# Patient Record
Sex: MALE | Race: WHITE | NOT HISPANIC OR LATINO | Employment: OTHER | URBAN - METROPOLITAN AREA
[De-identification: names, ages, dates, MRNs, and addresses within clinical notes are randomized per-mention and may not be internally consistent; named-entity substitution may affect disease eponyms.]

---

## 2017-04-17 ENCOUNTER — GENERIC CONVERSION - ENCOUNTER (OUTPATIENT)
Dept: OTHER | Facility: OTHER | Age: 82
End: 2017-04-17

## 2017-04-19 ENCOUNTER — ALLSCRIPTS OFFICE VISIT (OUTPATIENT)
Dept: OTHER | Facility: OTHER | Age: 82
End: 2017-04-19

## 2017-04-19 ENCOUNTER — GENERIC CONVERSION - ENCOUNTER (OUTPATIENT)
Dept: OTHER | Facility: OTHER | Age: 82
End: 2017-04-19

## 2017-04-20 ENCOUNTER — TRANSCRIBE ORDERS (OUTPATIENT)
Dept: ADMINISTRATIVE | Facility: HOSPITAL | Age: 82
End: 2017-04-20

## 2017-04-20 DIAGNOSIS — J42 UNSPECIFIED CHRONIC BRONCHITIS (HCC): ICD-10-CM

## 2017-04-20 DIAGNOSIS — R05.9 COUGH: Primary | ICD-10-CM

## 2017-04-21 ENCOUNTER — TRANSCRIBE ORDERS (OUTPATIENT)
Dept: LAB | Facility: CLINIC | Age: 82
End: 2017-04-21

## 2017-04-21 ENCOUNTER — APPOINTMENT (OUTPATIENT)
Dept: LAB | Facility: CLINIC | Age: 82
End: 2017-04-21
Payer: MEDICARE

## 2017-04-21 DIAGNOSIS — E08.00 DIABETES MELLITUS DUE TO UNDERLYING CONDITION WITH HYPEROSMOLARITY WITHOUT COMA, WITHOUT LONG-TERM CURRENT USE OF INSULIN (HCC): Primary | ICD-10-CM

## 2017-04-21 DIAGNOSIS — E08.00 DIABETES MELLITUS DUE TO UNDERLYING CONDITION WITH HYPEROSMOLARITY WITHOUT COMA, WITHOUT LONG-TERM CURRENT USE OF INSULIN (HCC): ICD-10-CM

## 2017-04-21 LAB
ALBUMIN SERPL BCP-MCNC: 3.1 G/DL (ref 3.5–5)
ALP SERPL-CCNC: 266 U/L (ref 46–116)
ALT SERPL W P-5'-P-CCNC: 28 U/L (ref 12–78)
ANION GAP SERPL CALCULATED.3IONS-SCNC: 6 MMOL/L (ref 4–13)
AST SERPL W P-5'-P-CCNC: 25 U/L (ref 5–45)
BASOPHILS # BLD AUTO: 0.03 THOUSANDS/ΜL (ref 0–0.1)
BASOPHILS NFR BLD AUTO: 1 % (ref 0–1)
BILIRUB SERPL-MCNC: 1.09 MG/DL (ref 0.2–1)
BUN SERPL-MCNC: 50 MG/DL (ref 5–25)
CALCIUM SERPL-MCNC: 8.6 MG/DL (ref 8.3–10.1)
CHLORIDE SERPL-SCNC: 107 MMOL/L (ref 100–108)
CHOLEST SERPL-MCNC: 135 MG/DL (ref 50–200)
CO2 SERPL-SCNC: 27 MMOL/L (ref 21–32)
CREAT SERPL-MCNC: 1.17 MG/DL (ref 0.6–1.3)
CREAT UR-MCNC: <13 MG/DL
EOSINOPHIL # BLD AUTO: 0.21 THOUSAND/ΜL (ref 0–0.61)
EOSINOPHIL NFR BLD AUTO: 3 % (ref 0–6)
ERYTHROCYTE [DISTWIDTH] IN BLOOD BY AUTOMATED COUNT: 17.3 % (ref 11.6–15.1)
EST. AVERAGE GLUCOSE BLD GHB EST-MCNC: 183 MG/DL
GFR SERPL CREATININE-BSD FRML MDRD: 59.8 ML/MIN/1.73SQ M
GLUCOSE P FAST SERPL-MCNC: 81 MG/DL (ref 65–99)
HBA1C MFR BLD: 8 % (ref 4.2–6.3)
HCT VFR BLD AUTO: 46.1 % (ref 36.5–49.3)
HDLC SERPL-MCNC: 58 MG/DL (ref 40–60)
HGB BLD-MCNC: 15.2 G/DL (ref 12–17)
LDLC SERPL CALC-MCNC: 63 MG/DL (ref 0–100)
LYMPHOCYTES # BLD AUTO: 1.9 THOUSANDS/ΜL (ref 0.6–4.47)
LYMPHOCYTES NFR BLD AUTO: 31 % (ref 14–44)
MCH RBC QN AUTO: 29.9 PG (ref 26.8–34.3)
MCHC RBC AUTO-ENTMCNC: 33 G/DL (ref 31.4–37.4)
MCV RBC AUTO: 91 FL (ref 82–98)
MICROALBUMIN UR-MCNC: 153 MG/L (ref 0–20)
MICROALBUMIN/CREAT 24H UR: >1177 MG/G CREATININE (ref 0–30)
MONOCYTES # BLD AUTO: 0.84 THOUSAND/ΜL (ref 0.17–1.22)
MONOCYTES NFR BLD AUTO: 14 % (ref 4–12)
NEUTROPHILS # BLD AUTO: 3.16 THOUSANDS/ΜL (ref 1.85–7.62)
NEUTS SEG NFR BLD AUTO: 51 % (ref 43–75)
NRBC BLD AUTO-RTO: 0 /100 WBCS
PLATELET # BLD AUTO: 95 THOUSANDS/UL (ref 149–390)
POTASSIUM SERPL-SCNC: 3.7 MMOL/L (ref 3.5–5.3)
PROT SERPL-MCNC: 6.9 G/DL (ref 6.4–8.2)
RBC # BLD AUTO: 5.08 MILLION/UL (ref 3.88–5.62)
SODIUM SERPL-SCNC: 140 MMOL/L (ref 136–145)
TRIGL SERPL-MCNC: 68 MG/DL
WBC # BLD AUTO: 6.15 THOUSAND/UL (ref 4.31–10.16)

## 2017-04-21 PROCEDURE — 85025 COMPLETE CBC W/AUTO DIFF WBC: CPT

## 2017-04-21 PROCEDURE — 83036 HEMOGLOBIN GLYCOSYLATED A1C: CPT

## 2017-04-21 PROCEDURE — 80053 COMPREHEN METABOLIC PANEL: CPT

## 2017-04-21 PROCEDURE — 82043 UR ALBUMIN QUANTITATIVE: CPT | Performed by: INTERNAL MEDICINE

## 2017-04-21 PROCEDURE — 36415 COLL VENOUS BLD VENIPUNCTURE: CPT

## 2017-04-21 PROCEDURE — 80061 LIPID PANEL: CPT

## 2017-04-21 PROCEDURE — 82570 ASSAY OF URINE CREATININE: CPT | Performed by: INTERNAL MEDICINE

## 2017-04-24 ENCOUNTER — ALLSCRIPTS OFFICE VISIT (OUTPATIENT)
Dept: OTHER | Facility: OTHER | Age: 82
End: 2017-04-24

## 2017-04-24 DIAGNOSIS — D69.6 THROMBOCYTOPENIA (HCC): ICD-10-CM

## 2017-04-24 DIAGNOSIS — I50.42 CHRONIC COMBINED SYSTOLIC AND DIASTOLIC HEART FAILURE (HCC): ICD-10-CM

## 2017-04-24 DIAGNOSIS — Z79.01 LONG TERM CURRENT USE OF ANTICOAGULANT: ICD-10-CM

## 2017-04-24 DIAGNOSIS — I48.91 ATRIAL FIBRILLATION (HCC): ICD-10-CM

## 2017-04-24 DIAGNOSIS — I35.0 NONRHEUMATIC AORTIC VALVE STENOSIS: ICD-10-CM

## 2017-05-10 ENCOUNTER — HOSPITAL ENCOUNTER (OUTPATIENT)
Dept: NON INVASIVE DIAGNOSTICS | Facility: CLINIC | Age: 82
Discharge: HOME | End: 2017-05-10
Payer: MEDICARE

## 2017-05-10 DIAGNOSIS — I48.91 ATRIAL FIBRILLATION (HCC): ICD-10-CM

## 2017-05-10 DIAGNOSIS — I50.42 CHRONIC COMBINED SYSTOLIC AND DIASTOLIC HEART FAILURE (HCC): ICD-10-CM

## 2017-05-10 DIAGNOSIS — I35.0 NONRHEUMATIC AORTIC VALVE STENOSIS: ICD-10-CM

## 2017-05-10 PROCEDURE — C8929 TTE W OR WO FOL WCON,DOPPLER: HCPCS

## 2017-05-10 RX ADMIN — PERFLUTREN 2 ML/MIN: 6.52 INJECTION, SUSPENSION INTRAVENOUS at 13:54

## 2017-05-11 ENCOUNTER — APPOINTMENT (OUTPATIENT)
Dept: LAB | Facility: CLINIC | Age: 82
End: 2017-05-11
Payer: MEDICARE

## 2017-05-11 ENCOUNTER — ALLSCRIPTS OFFICE VISIT (OUTPATIENT)
Dept: OTHER | Facility: OTHER | Age: 82
End: 2017-05-11

## 2017-05-11 ENCOUNTER — GENERIC CONVERSION - ENCOUNTER (OUTPATIENT)
Dept: OTHER | Facility: OTHER | Age: 82
End: 2017-05-11

## 2017-05-11 DIAGNOSIS — D69.6 THROMBOCYTOPENIA (HCC): ICD-10-CM

## 2017-05-11 LAB
BASOPHILS # BLD AUTO: 0.02 THOUSANDS/ΜL (ref 0–0.1)
BASOPHILS NFR BLD AUTO: 0 % (ref 0–1)
EOSINOPHIL # BLD AUTO: 0.19 THOUSAND/ΜL (ref 0–0.61)
EOSINOPHIL NFR BLD AUTO: 2 % (ref 0–6)
ERYTHROCYTE [DISTWIDTH] IN BLOOD BY AUTOMATED COUNT: 17.2 % (ref 11.6–15.1)
HCT VFR BLD AUTO: 45.5 % (ref 36.5–49.3)
HGB BLD-MCNC: 15.2 G/DL (ref 12–17)
LYMPHOCYTES # BLD AUTO: 1.88 THOUSANDS/ΜL (ref 0.6–4.47)
LYMPHOCYTES NFR BLD AUTO: 23 % (ref 14–44)
MCH RBC QN AUTO: 30.2 PG (ref 26.8–34.3)
MCHC RBC AUTO-ENTMCNC: 33.4 G/DL (ref 31.4–37.4)
MCV RBC AUTO: 90 FL (ref 82–98)
MONOCYTES # BLD AUTO: 0.82 THOUSAND/ΜL (ref 0.17–1.22)
MONOCYTES NFR BLD AUTO: 10 % (ref 4–12)
NEUTROPHILS # BLD AUTO: 5.15 THOUSANDS/ΜL (ref 1.85–7.62)
NEUTS SEG NFR BLD AUTO: 65 % (ref 43–75)
NRBC BLD AUTO-RTO: 0 /100 WBCS
PLATELET # BLD AUTO: 153 THOUSANDS/UL (ref 149–390)
RBC # BLD AUTO: 5.04 MILLION/UL (ref 3.88–5.62)
WBC # BLD AUTO: 8.08 THOUSAND/UL (ref 4.31–10.16)

## 2017-05-11 PROCEDURE — 85025 COMPLETE CBC W/AUTO DIFF WBC: CPT

## 2017-05-11 PROCEDURE — 84075 ASSAY ALKALINE PHOSPHATASE: CPT

## 2017-05-11 PROCEDURE — 84080 ASSAY ALKALINE PHOSPHATASES: CPT

## 2017-05-11 PROCEDURE — 36415 COLL VENOUS BLD VENIPUNCTURE: CPT

## 2017-05-12 ENCOUNTER — HOSPITAL ENCOUNTER (OUTPATIENT)
Dept: PULMONOLOGY | Facility: HOSPITAL | Age: 82
Discharge: HOME | End: 2017-05-12
Payer: MEDICARE

## 2017-05-12 ENCOUNTER — GENERIC CONVERSION - ENCOUNTER (OUTPATIENT)
Dept: OTHER | Facility: OTHER | Age: 82
End: 2017-05-12

## 2017-05-12 DIAGNOSIS — J42 UNSPECIFIED CHRONIC BRONCHITIS (HCC): ICD-10-CM

## 2017-05-12 DIAGNOSIS — R05.9 COUGH: ICD-10-CM

## 2017-05-12 PROCEDURE — 94760 N-INVAS EAR/PLS OXIMETRY 1: CPT

## 2017-05-12 PROCEDURE — 94060 EVALUATION OF WHEEZING: CPT

## 2017-05-12 PROCEDURE — 94727 GAS DIL/WSHOT DETER LNG VOL: CPT

## 2017-05-12 PROCEDURE — 94729 DIFFUSING CAPACITY: CPT

## 2017-05-15 LAB
ALP BONE CFR SERPL: 23 % (ref 12–68)
ALP INTEST CFR SERPL: 16 % (ref 0–18)
ALP LIVER CFR SERPL: 61 % (ref 13–88)
ALP SERPL-CCNC: 271 IU/L (ref 39–117)

## 2017-05-16 ENCOUNTER — HOSPITAL ENCOUNTER (EMERGENCY)
Facility: HOSPITAL | Age: 82
Discharge: HOME | End: 2017-05-16
Attending: EMERGENCY MEDICINE | Admitting: EMERGENCY MEDICINE
Payer: MEDICARE

## 2017-05-16 ENCOUNTER — APPOINTMENT (EMERGENCY)
Dept: RADIOLOGY | Facility: HOSPITAL | Age: 82
End: 2017-05-16
Payer: MEDICARE

## 2017-05-16 VITALS
TEMPERATURE: 98 F | HEART RATE: 66 BPM | RESPIRATION RATE: 19 BRPM | BODY MASS INDEX: 35 KG/M2 | OXYGEN SATURATION: 96 % | WEIGHT: 244.49 LBS | HEIGHT: 70 IN | DIASTOLIC BLOOD PRESSURE: 74 MMHG | SYSTOLIC BLOOD PRESSURE: 163 MMHG

## 2017-05-16 DIAGNOSIS — S42.309A HUMERUS FRACTURE: Primary | ICD-10-CM

## 2017-05-16 PROCEDURE — 93005 ELECTROCARDIOGRAM TRACING: CPT

## 2017-05-16 PROCEDURE — 73030 X-RAY EXAM OF SHOULDER: CPT

## 2017-05-16 PROCEDURE — 99284 EMERGENCY DEPT VISIT MOD MDM: CPT

## 2017-05-16 PROCEDURE — 96372 THER/PROPH/DIAG INJ SC/IM: CPT

## 2017-05-16 PROCEDURE — 73060 X-RAY EXAM OF HUMERUS: CPT

## 2017-05-16 RX ORDER — ALLOPURINOL 300 MG/1
300 TABLET ORAL DAILY
COMMUNITY
End: 2018-03-20 | Stop reason: SDUPTHER

## 2017-05-16 RX ORDER — MORPHINE SULFATE 4 MG/ML
4 INJECTION, SOLUTION INTRAMUSCULAR; INTRAVENOUS ONCE
Status: COMPLETED | OUTPATIENT
Start: 2017-05-16 | End: 2017-05-16

## 2017-05-16 RX ORDER — DOCUSATE SODIUM 100 MG/1
100 CAPSULE, LIQUID FILLED ORAL EVERY 12 HOURS
Qty: 60 CAPSULE | Refills: 0 | Status: SHIPPED | OUTPATIENT
Start: 2017-05-16 | End: 2017-06-15

## 2017-05-16 RX ORDER — OXYCODONE HYDROCHLORIDE 5 MG/1
5 TABLET ORAL EVERY 6 HOURS PRN
Qty: 20 TABLET | Refills: 0 | Status: SHIPPED | OUTPATIENT
Start: 2017-05-16 | End: 2018-05-15 | Stop reason: CLARIF

## 2017-05-16 RX ORDER — POTASSIUM CHLORIDE 20 MEQ/1
20 TABLET, EXTENDED RELEASE ORAL 2 TIMES DAILY
COMMUNITY
End: 2017-05-16

## 2017-05-16 RX ORDER — OXYCODONE HYDROCHLORIDE 5 MG/1
5 TABLET ORAL ONCE
Status: COMPLETED | OUTPATIENT
Start: 2017-05-16 | End: 2017-05-16

## 2017-05-16 RX ORDER — ISOSORBIDE MONONITRATE 10 MG/1
30 TABLET ORAL DAILY
Status: ON HOLD | COMMUNITY
End: 2018-05-23 | Stop reason: CLARIF

## 2017-05-16 RX ORDER — TORSEMIDE 20 MG/1
20 TABLET ORAL 3 TIMES DAILY
COMMUNITY
End: 2017-05-16

## 2017-05-16 RX ADMIN — MORPHINE SULFATE 4 MG: 4 INJECTION, SOLUTION INTRAMUSCULAR; INTRAVENOUS at 15:57

## 2017-05-16 RX ADMIN — OXYCODONE HYDROCHLORIDE 5 MG: 5 TABLET ORAL at 16:48

## 2017-05-17 LAB
ATRIAL RATE: 105 BPM
QRS AXIS: 234 DEGREES
QRSD INTERVAL: 160 MS
QT INTERVAL: 466 MS
QTC INTERVAL: 437 MS
T WAVE AXIS: 13 DEGREES
VENTRICULAR RATE: 53 BPM

## 2017-05-26 ENCOUNTER — HOSPITAL ENCOUNTER (OUTPATIENT)
Dept: RADIOLOGY | Facility: CLINIC | Age: 82
Discharge: HOME | End: 2017-05-26
Payer: MEDICARE

## 2017-05-26 ENCOUNTER — ALLSCRIPTS OFFICE VISIT (OUTPATIENT)
Dept: OTHER | Facility: OTHER | Age: 82
End: 2017-05-26

## 2017-05-26 DIAGNOSIS — S49.90XA INJURY OF SHOULDER OR UPPER ARM: ICD-10-CM

## 2017-05-26 PROCEDURE — 73030 X-RAY EXAM OF SHOULDER: CPT

## 2017-06-16 ENCOUNTER — GENERIC CONVERSION - ENCOUNTER (OUTPATIENT)
Dept: OTHER | Facility: OTHER | Age: 82
End: 2017-06-16

## 2017-06-27 ENCOUNTER — APPOINTMENT (OUTPATIENT)
Dept: RADIOLOGY | Facility: CLINIC | Age: 82
End: 2017-06-27
Payer: MEDICARE

## 2017-06-27 ENCOUNTER — ALLSCRIPTS OFFICE VISIT (OUTPATIENT)
Dept: OTHER | Facility: OTHER | Age: 82
End: 2017-06-27

## 2017-06-27 DIAGNOSIS — S42.295D OTHER NONDISPLACED FRACTURE OF UPPER END OF LEFT HUMERUS, SUBSEQUENT ENCOUNTER FOR FRACTURE WITH ROUTINE HEALING: ICD-10-CM

## 2017-06-27 DIAGNOSIS — M25.519 PAIN IN SHOULDER: ICD-10-CM

## 2017-06-27 PROCEDURE — 73030 X-RAY EXAM OF SHOULDER: CPT

## 2017-06-29 ENCOUNTER — ALLSCRIPTS OFFICE VISIT (OUTPATIENT)
Dept: OTHER | Facility: OTHER | Age: 82
End: 2017-06-29

## 2017-07-06 ENCOUNTER — GENERIC CONVERSION - ENCOUNTER (OUTPATIENT)
Dept: OTHER | Facility: OTHER | Age: 82
End: 2017-07-06

## 2017-07-12 ENCOUNTER — GENERIC CONVERSION - ENCOUNTER (OUTPATIENT)
Dept: OTHER | Facility: OTHER | Age: 82
End: 2017-07-12

## 2017-07-18 ENCOUNTER — GENERIC CONVERSION - ENCOUNTER (OUTPATIENT)
Dept: OTHER | Facility: OTHER | Age: 82
End: 2017-07-18

## 2017-07-25 ENCOUNTER — ALLSCRIPTS OFFICE VISIT (OUTPATIENT)
Dept: OTHER | Facility: OTHER | Age: 82
End: 2017-07-25

## 2017-07-25 ENCOUNTER — APPOINTMENT (OUTPATIENT)
Dept: RADIOLOGY | Facility: CLINIC | Age: 82
End: 2017-07-25
Payer: MEDICARE

## 2017-07-25 DIAGNOSIS — M25.519 PAIN IN SHOULDER: ICD-10-CM

## 2017-07-25 PROCEDURE — 73030 X-RAY EXAM OF SHOULDER: CPT

## 2017-07-26 ENCOUNTER — GENERIC CONVERSION - ENCOUNTER (OUTPATIENT)
Dept: OTHER | Facility: OTHER | Age: 82
End: 2017-07-26

## 2017-08-31 ENCOUNTER — GENERIC CONVERSION - ENCOUNTER (OUTPATIENT)
Dept: OTHER | Facility: OTHER | Age: 82
End: 2017-08-31

## 2017-09-06 ENCOUNTER — GENERIC CONVERSION - ENCOUNTER (OUTPATIENT)
Dept: OTHER | Facility: OTHER | Age: 82
End: 2017-09-06

## 2018-01-10 NOTE — MISCELLANEOUS
This is to state that this patient has no specific contraindication from cardiac standpoint to undergo retina surgery  He presents moderate risk based on his age and comorbidities  Patient should stop his anticoagulation  Eliquis for 2 days prior to surgery and restart when okay from surgical standpoint  If you have any specific questions, please do not hesitate to contact me  Electronically signed by:Alfreda MAHAN    Jul 12 2017 10:54PM EST

## 2018-01-11 NOTE — RESULT NOTES
Verified Results  (1) URIC ACID 02Jun2016 02:49PM Moni Modi   TW Order Number: [id number]  TW Order Number: [id number]     Test Name Result Flag Reference   URIC ACID 9 4 mg/dL H 4 2-8 0   Specimen collection should occur prior to Metamizole administration due to the potential for falsely depressed results

## 2018-01-13 VITALS
DIASTOLIC BLOOD PRESSURE: 70 MMHG | BODY MASS INDEX: 34.44 KG/M2 | OXYGEN SATURATION: 97 % | WEIGHT: 246 LBS | HEART RATE: 44 BPM | HEIGHT: 71 IN | SYSTOLIC BLOOD PRESSURE: 136 MMHG

## 2018-01-13 VITALS
WEIGHT: 244 LBS | HEART RATE: 46 BPM | OXYGEN SATURATION: 97 % | BODY MASS INDEX: 34.16 KG/M2 | DIASTOLIC BLOOD PRESSURE: 70 MMHG | HEIGHT: 71 IN | SYSTOLIC BLOOD PRESSURE: 150 MMHG

## 2018-01-13 VITALS
BODY MASS INDEX: 34.32 KG/M2 | HEART RATE: 58 BPM | DIASTOLIC BLOOD PRESSURE: 72 MMHG | SYSTOLIC BLOOD PRESSURE: 146 MMHG | OXYGEN SATURATION: 100 % | WEIGHT: 245.13 LBS | HEIGHT: 71 IN

## 2018-01-13 VITALS
BODY MASS INDEX: 34.44 KG/M2 | HEIGHT: 71 IN | DIASTOLIC BLOOD PRESSURE: 70 MMHG | OXYGEN SATURATION: 97 % | SYSTOLIC BLOOD PRESSURE: 138 MMHG | WEIGHT: 246 LBS | HEART RATE: 44 BPM

## 2018-01-13 VITALS
DIASTOLIC BLOOD PRESSURE: 70 MMHG | OXYGEN SATURATION: 97 % | BODY MASS INDEX: 34.44 KG/M2 | SYSTOLIC BLOOD PRESSURE: 138 MMHG | HEART RATE: 44 BPM | HEIGHT: 71 IN | WEIGHT: 246 LBS

## 2018-01-13 VITALS
HEIGHT: 71 IN | DIASTOLIC BLOOD PRESSURE: 76 MMHG | BODY MASS INDEX: 34.34 KG/M2 | SYSTOLIC BLOOD PRESSURE: 171 MMHG | OXYGEN SATURATION: 100 % | HEART RATE: 78 BPM | WEIGHT: 245.31 LBS

## 2018-01-13 VITALS
SYSTOLIC BLOOD PRESSURE: 173 MMHG | WEIGHT: 241 LBS | HEART RATE: 60 BPM | DIASTOLIC BLOOD PRESSURE: 66 MMHG | BODY MASS INDEX: 33.74 KG/M2 | HEIGHT: 71 IN

## 2018-01-14 VITALS
SYSTOLIC BLOOD PRESSURE: 169 MMHG | BODY MASS INDEX: 35 KG/M2 | HEART RATE: 77 BPM | DIASTOLIC BLOOD PRESSURE: 74 MMHG | HEIGHT: 71 IN | WEIGHT: 250 LBS

## 2018-01-15 VITALS
BODY MASS INDEX: 33.47 KG/M2 | SYSTOLIC BLOOD PRESSURE: 140 MMHG | DIASTOLIC BLOOD PRESSURE: 62 MMHG | WEIGHT: 240 LBS | HEART RATE: 68 BPM

## 2018-01-16 NOTE — MISCELLANEOUS
Assessment   1  Acute gouty arthropathy (274 01) (M10 00)  2  Gout (274 9) (M10 9)    Plan  Acute gouty arthropathy, Gout    · (1) URIC ACID; Status:Active; Requested NRY:[id number];   Perform:[facility]; OCV:[id number]; Last Updated By:Gaudencio Chambers; 6/2/2016 2:17:11 PM;Ordered; For:Acute gouty arthropathy, Gout; Ordered   By:Ronel Deal;  Atrial fibrillation, Hypertension    · Changed: From  Isosorbide Mononitrate ER 30 MG Oral Tablet Extended Release 24  Hour TAKE 1 TABLET ONCE DAILY To Isosorbide Mononitrate ER 30 MG Oral Tablet  Extended Release 24 Hour (Imdur) Take 1 tablet once daily  Rx By: Ronel Valencia; Dispense: 90 Days ; #:90 Tablet Extended Release 24   Hour; Refill: 3;For: Atrial fibrillation, Hypertension; HERBER = N; Record  Gout    · Changed: From  Allopurinol 100 MG Oral Tablet TAKE 1 TABLET DAILY To Allopurinol 300  MG Oral Tablet TAKE 1 TABLET DAILY  Rx By: Ronel Valencia; Dispense: 30 Days ; #:30 Tablet; Refill: 5;For: Gout; HERBER   = N; Sent To: [facility] [id number]    Discussion/Summary  Discussion Summary:   Acute gouty flare-improving on colchicine  continue colchicine for now  f/u next week for a recheck  start allopurinol 300mg daily  take 3 of your 100mg plls until you run out  f/u with dr Alice Cortes next week  case d/w dr Alice Cortes who saw pt and agrees with the A/P  Chief Complaint  Chief Complaint Free Text Note Form: Hospital follow-up      History of Present Illness  TCM Communication [facility]: The patient is being contacted for follow-up after hospitalization and HAS APPT  He was hospitalized at [place] [address]  The date of discharge: 5/28/16  Diagnosis: CELLULITIS  Medications reviewed and updated today  He scheduled a follow up appointment  Counseling was provided to the patient  DOING WELL  Communication performed and completed by LAUREN SANTOS   HPI: patient presents for hospital follow-up   He was admitted to [facility] on May 23rd and discharged on May 28  Initially he was felt to have right arm and hand cellulitis however the diagnosis changed to an acute gouty flareup  He was initially started on anti-biotics and then these were stopped and he was put on colchicine  He comes in today with continued swelling of the right hand and wrist but he states it was 10 times worse even at the time of discharge, it has improved since  He has much less pain, and no redness which she had before  the patient had fluid drained from the elbow and this was positive for uric acid crystals  complete labs can be viewed in Epic     patient had a CTA of the chest which showed no PE and no acute process, a Doppler ultrasound of both upper extremity showed no evidence of DVT,an x-ray of the right hand showed arthritic changes, no acute osseous abnormality  patient reports that he was on allopurinol 100 mg daily and he stopped in the hospital  He was not restarted on allopurinol but the plan would be possibly to restart it after the acute flare resolved  Review of Systems  Complete-Male:   Constitutional: No fever or chills, feels well, no tiredness, no recent weight gain or weight loss  Eyes: No complaints of eye pain, no red eyes, no discharge from eyes, no itchy eyes  ENT: no complaints of earache, no hearing loss, no nosebleeds, no nasal discharge, no sore throat, no hoarseness  Cardiovascular: No complaints of slow heart rate, no fast heart rate, no chest pain, no palpitations, no leg claudication, no lower extremity  Respiratory: No complaints of shortness of breath, no wheezing, no cough, no SOB on exertion, no orthopnea or PND  Gastrointestinal: No complaints of abdominal pain, no constipation, no nausea or vomiting, no diarrhea or bloody stools  Musculoskeletal: arthralgias, joint swelling and limb swelling  ROS Reviewed:   ROS reviewed  Active Problems   1  Aftercare for healing traumatic fracture of hip (V54 13) (C79 033Q)  2  Anticoagulated by anticoagulation treatment (V58 61) (Z79 01)  3  Aortic stenosis (424 1) (I35 0)  4  Arteriosclerotic heart disease (414 00) (I25 10)  5  Atrial fibrillation (427 31) (I48 91)  6  Cellulitis of hand (682 4) (L03 119)  7  Chronic combined systolic and diastolic CHF (congestive heart failure) (428 42,428 0)   (I50 42)  8  Chronic obstructive pulmonary disease (496) (J44 9)  9  Community acquired pneumonia (5) (J18 9)  10  Consolidation lung (481) (J18 1)  11  Duodenal ulcer (532 90) (K26 9)  12  Edema (782 3) (R60 9)  13  Esophageal reflux (530 81) (K21 9)  14  Finger stiffness (719 54) (M25 649)  15  History of gastrointestinal hemorrhage (V12 79) (Z87 19)  16  Hyperlipidemia (272 4) (E78 5)  17  Hypertension (401 9) (I10)  18  Intertrochanteric fx-closed (820 21) (S72 143A)  19  Lung mass (786 6) (R91 8)  20  Nonspecific abnormal findings on radiological and examination of lung field (793 19)    (R91 8)  21  Pain on swallowing (787 20) (R13 10)  22  Pleural effusion (511 9) (J90)  23  Pulmonary nodule seen on imaging study (793 11) (R91 1)  24  Shortness of breath (786 05) (R06 02)  25  Skin rash (782 1) (R21)  26  Stasis ulcer of lower extremity (454 0) (I83 009)  27  Type 2 diabetes mellitus (250 00) (E11 9)  28  Vitamin D deficiency (268 9) (E55 9)    Past Medical History   1  Aftercare for healing traumatic fracture of hip (V54 13) (S72 009D)  2  History of Congestive heart failure (428 0) (I50 9)  3  History of Coronary Artery Disease (V12 59)  4  History of Diabetes Mellitus (250 00)  5  History of gastrointestinal hemorrhage (V12 79) (Z87 19)  6  History of hyperlipidemia (V12 29) (Z86 39)  7  History of hypertension (V12 59) (Z86 79)  8  History of hypertension (V12 59) (Z86 79)  9  History of Nonspecific abnormal findings on radiological and examination of lung field   (793 19) (R91 8)    Surgical History   1  History of CABG  2  History of Hip Surgery Left  3   History of Knee Surgery Left  Surgical History Reviewed: The surgical history was reviewed and updated today  Family History  Father   1  No pertinent family history  Family History   2  Family history of Cancer  3  Family history of Diabetes Mellitus (V18 0)  4  Family history of Heart Disease (V17 49)  5  Family history of No Significant Family History  Family History Reviewed: The family history was reviewed and updated today  Social History    · Denied: History of Alcohol Use (History)   · Denied: History of Drug Use   · Former smoker (P38 51) (N42 358)  Social History Reviewed: The social history was reviewed and updated today  Current Meds  1  Aspirin 81 MG TABS; TAKE 1 TABLET DAILY; Therapy: 45XVP0518 to (Evaluate:26Jun2015) Recorded  2  Colcrys 0 6 MG Oral Tablet; TAKE 1 TABLET DAILY AS DIRECTED; Therapy: (Recorded:02Jun2016) to Recorded  3  Eliquis 2 5 MG Oral Tablet; 1 tab po bid; Therapy: 36BSA8001 to (Last Rx:52Xda9861) Ordered  4  Klor-Con M20 20 MEQ Oral Tablet Extended Release; TAKE 1 TABLET EVERY 12   HOURS; Therapy: 83UVE0531 to (Evaluate:14Apr2017)  Requested for: 19Apr2016; Last   Rx:19Apr2016 Ordered  5  Lantus 100 UNIT/ML Subcutaneous Solution; Take 40 units at bedtime  Requested for:   31Fek2752; Last Rx:23Apr2015 Ordered  6  Metolazone 2 5 MG Oral Tablet; 1 tablet on Tuesday and Friday for weight gain of more   than 4 pounds   prn;   Therapy: 22KFB5725 to  Requested for: 39RFP4999 Recorded  7  Metoprolol Succinate ER 25 MG Oral Tablet Extended Release 24 Hour; TAKE 1 TABLET   BY MOUTH ONCE A DAY  Requested for: 90FRQ4183; Last Rx:05Gyj0509 Ordered  8  NovoLOG FlexPen 100 UNIT/ML Subcutaneous Solution Pen-injector; Therapy: 47VFO1460 to Recorded  9  Pravastatin Sodium 10 MG Oral Tablet; TAKE 1 TABLET DAILY; Therapy: 08FNP5923 to (Rakesh Oquendo)  Requested for: 25KZE8101; Last   Rx:73Osw9253 Ordered  10   Torsemide 20 MG Oral Tablet; 2 tabs in the am and 1 tab in the afternoon; Therapy: 23Apr2015 to (Evaluate:14Apr2017)  Requested for: 19Apr2016; Last    Rx:19Apr2016 Ordered  11  UltiCare Insulin Syringe 31G X 5/16" 1 ML Miscellaneous; Therapy: 06Aug2013 to (Evaluate:21Jan2015) Recorded  Medication List Reviewed: The medication list was reviewed and updated today  Allergies   1  No Known Drug Allergies   2  Tape    Vitals  Signs [Data Includes: Current Encounter]   Recorded: 64URP2421 88:01ZC   Systolic: 849  Diastolic: 58  Recorded: 04CDK7231 01:24PM   Heart Rate: 60  Systolic: 085  Diastolic: 62  BP Cuff Size: Large  Height: 5 ft 11 in  Weight: 244 lb   BMI Calculated: 34 03  BSA Calculated: 2 29    Physical Exam    Constitutional   General appearance: No acute distress, well appearing and well nourished  Ears, Nose, Mouth, and Throat   Otoscopic examination: Tympanic membrance translucent with normal light reflex  Canals patent without erythema  Nasal mucosa, septum, and turbinates: Normal without edema or erythema  Oropharynx: Normal with no erythema, edema, exudate or lesions  Pulmonary   Respiratory effort: No increased work of breathing or signs of respiratory distress  Auscultation of lungs: Abnormal   Auscultation of the lungs revealed bibasilar rales/crackles and faint  Cardiovascular   Auscultation of heart: Abnormal   His rhythm is irregularly irregular  Examination of extremities for edema and/or varicosities: Abnormal   He has severe stasis edema of his lower extremities  Abdomen   Abdomen: Non-tender, no masses  Lymphatic   Palpation of lymph nodes in neck: No lymphadenopathy  Musculoskeletal   Inspection/palpation of joints, bones, and muscles: Abnormal   He has diffuse osteophytic changes of his hands, significant edema of the right hand and wrist, no redness  The patient and his son state it was much worse when he was discharged from the hospital    Neurologic   Cranial nerves: Cranial nerves 2-12 intact      Psychiatric   Mood and affect: Normal          Health Management  Type 2 diabetes mellitus   (1) HEMOGLOBIN A1C; every 6 months; Last 05SDK2136; Next Due: 43RNO0139; Active  (1) MICROALBUMIN CREATININE RATIO, RANDOM URINE; every 1 year; Last 94OKY0974; Next Due:  09EET9657; Active  *VB - Eye Exam; every 1 year; Last 33KDG7310; Next Due: 69GCN4872; Overdue  *VB-Foot Exam; every 1 year; Last 79FKV9869; Next Due: 51Pze5177; Active  Health Maintenance   Medicare Annual Wellness Visit; every 1 year; Next Due: 06VYY6679; Overdue    Future Appointments    Date/Time Provider Specialty Site   06/20/2016 03:30 PM FANNY Barron   Cardiology [facility] CARDIOLOGY [facility]   07/11/2016 03:00 PM Bryce Moscoso DO Internal Medicine [facility]   06/09/2016 02:30 PM Kaye Edwards [facility] Internal Medicine [facility]   07/13/2016 02:20 PM Todd Rai DO Pulmonary Medicine  [address]     Signatures   Electronically signed by : Daiana Amato [facility]; Jun 2 2016  2:21PM EST                       (Author)    Electronically signed by : Sridevi Galeano DO; Jun 2 2016  5:02PM EST                       (Co-author)    Electronically signed by : Sridevi Galeano DO; Jun 2 2016  5:02PM EST                       (Co-author)

## 2018-01-18 NOTE — RESULT NOTES
Verified Results  ECHO COMPLETE WITH CONTRAST IF INDICATED 46NAY0409 12:52PM Jesica Méndez Order Number: [id number]    - Patient Instructions: To schedule this appointment, please contact Central Scheduling at [phone]  Test Name Result Flag Reference   ECHO COMPLETE WITH CONTRAST IF INDICATED (Report)     [address] [facility]   [phone]     Transthoracic Echocardiogram   Limited 2D, M-mode, Doppler, and Color Doppler     Study date: 10-May-2017     Patient: Cayla Bucio   MR number: [id number]   Account number: [de-identified]   : 1935   Age: 81 years   Gender: Male   Status: Outpatient   Location: [facility]   Height: 71 in   Weight: 245 lb   BP: 146/ 72 mmHg     Indications: Atrial fibrillation  Diagnoses: I48 0 - Atrial fibrillation     Sonographer: Nieto RCS   Interpreting Physician: Linda Hernandez MD   Primary Physician: Stacey Vaz DO   Referring Physician: Linda Hernandez MD   Group: [facility]     SUMMARY     LEFT VENTRICLE:   The ventricle was mildly dilated  Ejection fraction was estimated to be 40 % to 45%  There is abnormal septal motion c/w history of CABG  There is hypokinesis of the distal septum, apex and distal anetrior wall  RIGHT VENTRICLE:   The ventricle was mildly dilated  Systolic function was mildly reduced  Estimated peak pressure was at least 35 mmHg  LEFT ATRIUM:   The atrium was mildly dilated  RIGHT ATRIUM:   The atrium was dilated  MITRAL VALVE:   There was mild to moderate annular calcification  There was trace regurgitation  AORTIC VALVE:   Leaflets exhibited moderate calcification  There was moderate stenosis  Peak gradient 38 mm Hg and mean 25 mm Hg  TRICUSPID VALVE:   There was mild regurgitation  HISTORY: PRIOR HISTORY: AS  Pleural effusion  COPD  Congestive heart failure   Risk factors: hypertension, diabetes, medication-treated hypercholesterolemia, and a family history of coronary artery disease  PRIOR PROCEDURES: Coronary   bypass  PROCEDURE: The study was performed in the [address] [place] [address]  This was a routine study  The transthoracic approach was used  The study included limited 2D imaging, M-mode, limited spectral Doppler, and color Doppler  The heart   rate was 63 bpm, at the start of the study  Images were obtained from the parasternal and apical acoustic windows  Intravenous contrast (Definity solution [1 3 ml Definity/8 7ml normal saline solution], 2 ml) was administered to opacify   the left ventricle  This was a technically difficult study  LEFT VENTRICLE: The ventricle was mildly dilated  Ejection fraction was estimated to be 40 % to 45%  There is abnormal septal motion c/w history of CABG  There is hypokinesis of the distal septum, apex and distal anetrior wall  RIGHT VENTRICLE: The ventricle was mildly dilated  Systolic function was mildly reduced  Wall thickness was normal  DOPPLER: Estimated peak pressure was at least 35 mmHg  LEFT ATRIUM: The atrium was mildly dilated  RIGHT ATRIUM: The atrium was dilated  MITRAL VALVE: There was mild to moderate annular calcification  DOPPLER: There was trace regurgitation  AORTIC VALVE: Leaflets exhibited moderate calcification  The valve was not well visualized  DOPPLER: There was moderate stenosis  Peak gradient 38 mm Hg and mean 25 mm Hg  TRICUSPID VALVE: The valve structure was normal  There was normal leaflet separation  DOPPLER: The transtricuspid velocity was within the normal range  There was no evidence for stenosis  There was mild regurgitation  PULMONIC VALVE: Leaflets exhibited normal thickness, no calcification, and normal cuspal separation  DOPPLER: The transpulmonic velocity was within the normal range  There was no regurgitation  PERICARDIUM: There was no pericardial effusion  The pericardium was normal in appearance  AORTA: The root exhibited normal size  SYSTEM MEASUREMENT TABLES     Apical four chamber   TAPSE: 10 1 mm     Unspecified Scan Mode   Aortic Root Diameter; End Systole;: 37 6 mm   Aortic valve Area; Continuity Equation by Velocity Time Integral; Systole;: 1 16 cm2   Cardiovascular Orifice Diameter; End Systole;: 21 7 mm   Gradient Pressure, Peak; Simplified Bernoulli; Antegrade Flow; Systole;: 33 2 mm[Hg]   Gradient pressure, average; Simplified Bernoulli; Antegrade Flow; Systole;: 21 6 mm[Hg]   Left atrial diameter; End Diastole;: 38 5 mm   Cardiac Output; Teichholz; Systole;: 3 32 L/min   Interventricular Septum Diastolic Thickness; Teichholz; End Diastole;: 12 5 mm   Left Ventricle Internal End Diastolic Dimension; Teichholz;: 49 4 mm   Left Ventricle Internal Systolic Dimension; Teichholz; End Systole;: 38 1 mm   Left Ventricle Posterior Wall Diastolic Thickness; Teichholz; End Diastole;: 12 1 mm   Left Ventricular Ejection Fraction; Teichholz;: 45 8 %   Stroke volume;  Teichholz; Systole;: 52 7 ml   Mitral Valve Area; Area by Pressure Half-Time; Systole;: 2 89 cm2   Maximum Tricuspid valve regurgitation pressure gradient; Regurgitant Flow; Systole;: 29 1 mm[Hg]     [facility] Accredited [facility]     Prepared and electronically signed by     Lidia Montejo MD   Signed 11-May-2017 19:56:34

## 2018-03-20 DIAGNOSIS — E79.0 HYPERURICEMIA: Primary | ICD-10-CM

## 2018-03-20 RX ORDER — ALLOPURINOL 300 MG/1
TABLET ORAL
Qty: 90 TABLET | Refills: 0 | Status: ON HOLD | OUTPATIENT
Start: 2018-03-20 | End: 2018-05-23 | Stop reason: CLARIF

## 2018-04-16 ENCOUNTER — TELEPHONE (OUTPATIENT)
Dept: INTERNAL MEDICINE CLINIC | Facility: CLINIC | Age: 83
End: 2018-04-16

## 2018-04-16 NOTE — TELEPHONE ENCOUNTER
Per dr Hannah Caldwell urgent care needs urine and culture, as dr Hannah Caldwell stated he can not prescribe something as he does not know what this is

## 2018-04-16 NOTE — TELEPHONE ENCOUNTER
Patient is in [place]   Thinks he has a UTI  Every drs office he called is a 5 week wait  Wants Dr Shady Cruz to call him and hopefully he can rx something for him  Please call him on his cell at [phone]

## 2018-04-22 DIAGNOSIS — I10 ESSENTIAL HYPERTENSION: Primary | ICD-10-CM

## 2018-04-23 RX ORDER — POTASSIUM CHLORIDE 1500 MG/1
TABLET, EXTENDED RELEASE ORAL
Qty: 180 TABLET | Refills: 1 | Status: SHIPPED | OUTPATIENT
Start: 2018-04-23 | End: 2018-10-13 | Stop reason: SDUPTHER

## 2018-05-07 ENCOUNTER — TELEPHONE (OUTPATIENT)
Dept: INTERNAL MEDICINE CLINIC | Facility: CLINIC | Age: 83
End: 2018-05-07

## 2018-05-07 NOTE — TELEPHONE ENCOUNTER
Pt called-scheduled a 6 month fup w/ Dr Xuan Zelaya  He's asking if Dr Xuan Zelaya would want labs to be done before hand  I scheduled him for Tuesday 5/15     Please call patient at [phone]

## 2018-05-08 ENCOUNTER — TRANSCRIBE ORDERS (OUTPATIENT)
Dept: LAB | Facility: CLINIC | Age: 83
End: 2018-05-08

## 2018-05-08 ENCOUNTER — APPOINTMENT (OUTPATIENT)
Dept: LAB | Facility: CLINIC | Age: 83
End: 2018-05-08
Payer: MEDICARE

## 2018-05-08 DIAGNOSIS — Z79.4 ENCOUNTER FOR LONG-TERM (CURRENT) USE OF INSULIN (HCC): ICD-10-CM

## 2018-05-08 DIAGNOSIS — E13.8 DIABETES MELLITUS OF OTHER TYPE WITH COMPLICATION, UNSPECIFIED WHETHER LONG TERM INSULIN USE: ICD-10-CM

## 2018-05-08 DIAGNOSIS — I10 ESSENTIAL HYPERTENSION: Primary | ICD-10-CM

## 2018-05-08 DIAGNOSIS — R30.0 DYSURIA: Primary | ICD-10-CM

## 2018-05-08 LAB
ANION GAP SERPL CALCULATED.3IONS-SCNC: 6 MMOL/L (ref 4–13)
BUN SERPL-MCNC: 43 MG/DL (ref 5–25)
CALCIUM SERPL-MCNC: 9 MG/DL (ref 8.3–10.1)
CHLORIDE SERPL-SCNC: 105 MMOL/L (ref 100–108)
CHOLEST SERPL-MCNC: 112 MG/DL (ref 50–200)
CO2 SERPL-SCNC: 26 MMOL/L (ref 21–32)
CREAT SERPL-MCNC: 1.08 MG/DL (ref 0.6–1.3)
CREAT UR-MCNC: 15.1 MG/DL
EST. AVERAGE GLUCOSE BLD GHB EST-MCNC: 189 MG/DL
GFR SERPL CREATININE-BSD FRML MDRD: 64 ML/MIN/1.73SQ M
GLUCOSE P FAST SERPL-MCNC: 164 MG/DL (ref 65–99)
HBA1C MFR BLD: 8.2 % (ref 4.2–6.3)
HDLC SERPL-MCNC: 57 MG/DL (ref 40–60)
LDLC SERPL CALC-MCNC: 42 MG/DL (ref 0–100)
MICROALBUMIN UR-MCNC: 292 MG/L (ref 0–20)
MICROALBUMIN/CREAT 24H UR: 1934 MG/G CREATININE (ref 0–30)
NONHDLC SERPL-MCNC: 55 MG/DL
POTASSIUM SERPL-SCNC: 4.4 MMOL/L (ref 3.5–5.3)
SODIUM SERPL-SCNC: 137 MMOL/L (ref 136–145)
TRIGL SERPL-MCNC: 63 MG/DL

## 2018-05-08 PROCEDURE — 83036 HEMOGLOBIN GLYCOSYLATED A1C: CPT

## 2018-05-08 PROCEDURE — 82570 ASSAY OF URINE CREATININE: CPT | Performed by: INTERNAL MEDICINE

## 2018-05-08 PROCEDURE — 80048 BASIC METABOLIC PNL TOTAL CA: CPT

## 2018-05-08 PROCEDURE — 82043 UR ALBUMIN QUANTITATIVE: CPT | Performed by: INTERNAL MEDICINE

## 2018-05-08 PROCEDURE — 36415 COLL VENOUS BLD VENIPUNCTURE: CPT

## 2018-05-08 PROCEDURE — 80061 LIPID PANEL: CPT

## 2018-05-14 ENCOUNTER — APPOINTMENT (OUTPATIENT)
Dept: LAB | Facility: CLINIC | Age: 83
End: 2018-05-14
Payer: MEDICARE

## 2018-05-14 DIAGNOSIS — R30.0 DYSURIA: ICD-10-CM

## 2018-05-14 DIAGNOSIS — I10 ESSENTIAL HYPERTENSION: ICD-10-CM

## 2018-05-14 LAB
BACTERIA UR QL AUTO: ABNORMAL /HPF
BASOPHILS # BLD AUTO: 0.02 THOUSANDS/ΜL (ref 0–0.1)
BASOPHILS NFR BLD AUTO: 0 % (ref 0–1)
BILIRUB UR QL STRIP: NEGATIVE
CLARITY UR: ABNORMAL
COLOR UR: YELLOW
EOSINOPHIL # BLD AUTO: 0.15 THOUSAND/ΜL (ref 0–0.61)
EOSINOPHIL NFR BLD AUTO: 2 % (ref 0–6)
ERYTHROCYTE [DISTWIDTH] IN BLOOD BY AUTOMATED COUNT: 17 % (ref 11.6–15.1)
GLUCOSE UR STRIP-MCNC: NEGATIVE MG/DL
HCT VFR BLD AUTO: 43.3 % (ref 36.5–49.3)
HGB BLD-MCNC: 14.4 G/DL (ref 12–17)
HGB UR QL STRIP.AUTO: ABNORMAL
HYALINE CASTS #/AREA URNS LPF: ABNORMAL /LPF
KETONES UR STRIP-MCNC: NEGATIVE MG/DL
LEUKOCYTE ESTERASE UR QL STRIP: ABNORMAL
LYMPHOCYTES # BLD AUTO: 1.86 THOUSANDS/ΜL (ref 0.6–4.47)
LYMPHOCYTES NFR BLD AUTO: 20 % (ref 14–44)
MCH RBC QN AUTO: 30.3 PG (ref 26.8–34.3)
MCHC RBC AUTO-ENTMCNC: 33.3 G/DL (ref 31.4–37.4)
MCV RBC AUTO: 91 FL (ref 82–98)
MONOCYTES # BLD AUTO: 1.19 THOUSAND/ΜL (ref 0.17–1.22)
MONOCYTES NFR BLD AUTO: 13 % (ref 4–12)
NEUTROPHILS # BLD AUTO: 5.95 THOUSANDS/ΜL (ref 1.85–7.62)
NEUTS SEG NFR BLD AUTO: 65 % (ref 43–75)
NITRITE UR QL STRIP: NEGATIVE
NON-SQ EPI CELLS URNS QL MICRO: ABNORMAL /HPF
NRBC BLD AUTO-RTO: 0 /100 WBCS
PH UR STRIP.AUTO: 6.5 [PH] (ref 4.5–8)
PLATELET # BLD AUTO: 166 THOUSANDS/UL (ref 149–390)
PROT UR STRIP-MCNC: ABNORMAL MG/DL
RBC # BLD AUTO: 4.75 MILLION/UL (ref 3.88–5.62)
RBC #/AREA URNS AUTO: ABNORMAL /HPF
SP GR UR STRIP.AUTO: 1.01 (ref 1–1.03)
UROBILINOGEN UR QL STRIP.AUTO: 1 E.U./DL
WBC # BLD AUTO: 9.19 THOUSAND/UL (ref 4.31–10.16)
WBC #/AREA URNS AUTO: ABNORMAL /HPF

## 2018-05-14 PROCEDURE — 36415 COLL VENOUS BLD VENIPUNCTURE: CPT

## 2018-05-14 PROCEDURE — 87186 SC STD MICRODIL/AGAR DIL: CPT

## 2018-05-14 PROCEDURE — 87077 CULTURE AEROBIC IDENTIFY: CPT

## 2018-05-14 PROCEDURE — 87086 URINE CULTURE/COLONY COUNT: CPT

## 2018-05-14 PROCEDURE — 81001 URINALYSIS AUTO W/SCOPE: CPT | Performed by: INTERNAL MEDICINE

## 2018-05-14 PROCEDURE — 85025 COMPLETE CBC W/AUTO DIFF WBC: CPT

## 2018-05-14 RX ORDER — METOPROLOL SUCCINATE 25 MG/1
1 TABLET, EXTENDED RELEASE ORAL DAILY
COMMUNITY
End: 2018-05-18 | Stop reason: SDUPTHER

## 2018-05-14 RX ORDER — COLCHICINE 0.6 MG/1
1 TABLET ORAL DAILY
COMMUNITY
End: 2018-05-21 | Stop reason: SDUPTHER

## 2018-05-14 RX ORDER — BLOOD SUGAR DIAGNOSTIC
STRIP MISCELLANEOUS
COMMUNITY
Start: 2013-08-06

## 2018-05-15 ENCOUNTER — OFFICE VISIT (OUTPATIENT)
Dept: INTERNAL MEDICINE CLINIC | Facility: CLINIC | Age: 83
End: 2018-05-15
Payer: MEDICARE

## 2018-05-15 VITALS
WEIGHT: 234 LBS | BODY MASS INDEX: 33.5 KG/M2 | SYSTOLIC BLOOD PRESSURE: 148 MMHG | DIASTOLIC BLOOD PRESSURE: 62 MMHG | HEIGHT: 70 IN | HEART RATE: 63 BPM | OXYGEN SATURATION: 93 %

## 2018-05-15 DIAGNOSIS — R60.9 EDEMA, UNSPECIFIED TYPE: ICD-10-CM

## 2018-05-15 DIAGNOSIS — E11.65 TYPE 2 DIABETES MELLITUS WITH HYPERGLYCEMIA, WITH LONG-TERM CURRENT USE OF INSULIN (HCC): Primary | ICD-10-CM

## 2018-05-15 DIAGNOSIS — I10 ESSENTIAL HYPERTENSION: ICD-10-CM

## 2018-05-15 DIAGNOSIS — Z79.4 TYPE 2 DIABETES MELLITUS WITH HYPERGLYCEMIA, WITH LONG-TERM CURRENT USE OF INSULIN (HCC): Primary | ICD-10-CM

## 2018-05-15 DIAGNOSIS — N30.01 ACUTE CYSTITIS WITH HEMATURIA: ICD-10-CM

## 2018-05-15 DIAGNOSIS — I25.10 CORONARY ARTERY DISEASE INVOLVING NATIVE HEART WITHOUT ANGINA PECTORIS, UNSPECIFIED VESSEL OR LESION TYPE: ICD-10-CM

## 2018-05-15 DIAGNOSIS — I48.20 CHRONIC A-FIB (HCC): ICD-10-CM

## 2018-05-15 DIAGNOSIS — I50.42 CHRONIC COMBINED SYSTOLIC AND DIASTOLIC CONGESTIVE HEART FAILURE (HCC): ICD-10-CM

## 2018-05-15 PROCEDURE — 99214 OFFICE O/P EST MOD 30 MIN: CPT | Performed by: INTERNAL MEDICINE

## 2018-05-15 RX ORDER — CIPROFLOXACIN 500 MG/1
500 TABLET, FILM COATED ORAL EVERY 12 HOURS SCHEDULED
Qty: 14 TABLET | Refills: 0 | Status: SHIPPED | OUTPATIENT
Start: 2018-05-22 | End: 2018-05-26 | Stop reason: HOSPADM

## 2018-05-16 DIAGNOSIS — I25.10 ATHEROSCLEROTIC CARDIOVASCULAR DISEASE: Primary | ICD-10-CM

## 2018-05-16 LAB — BACTERIA UR CULT: ABNORMAL

## 2018-05-16 RX ORDER — ISOSORBIDE MONONITRATE 30 MG/1
TABLET, EXTENDED RELEASE ORAL
Qty: 90 TABLET | Refills: 2 | Status: SHIPPED | OUTPATIENT
Start: 2018-05-16 | End: 2018-05-21 | Stop reason: SDUPTHER

## 2018-05-16 NOTE — PROGRESS NOTES
Assessment/Plan:  He appears to have a urinary tract infection at the present time  He is going to have an ultrasound of his kidney and bladder  He was given Cipro to take  Regarding his diabetes he is followed by the endocrinologist   A1c was 8 3  He has coronary artery disease but no chest pain  He has atrial fibrillation and chronic aortic stenosis  No signs of CHF at the present time  Edema is present       His cholesterol was 112       For the urinary tract infection he will be given Cipro b i d   Side effects explained  He will have an ultrasound of his kidney and bladder  He will call if not completely improved  He will follow up with his other specialists  He will repeat the urinalysis 1 week after therapy  Recent Results (from the past 1008 hour(s))   Microalbumin / creatinine urine ratio    Collection Time: 05/08/18 10:31 AM   Result Value Ref Range    Creatinine, Ur 15 1 mg/dL    Microalbum  ,U,Random 292 0 (H) 0 0 - 20 0 mg/L    Microalb Creat Ratio 1,934 (H) 0 - 30 mg/g creatinine   HEMOGLOBIN A1C W/ EAG ESTIMATION    Collection Time: 05/08/18 10:31 AM   Result Value Ref Range    Hemoglobin A1C 8 2 (H) 4 2 - 6 3 %     mg/dl   Lipid panel    Collection Time: 05/08/18 10:31 AM   Result Value Ref Range    Cholesterol 112 50 - 200 mg/dL    Triglycerides 63 <=150 mg/dL    HDL, Direct 57 40 - 60 mg/dL    LDL Calculated 42 0 - 100 mg/dL    Non-HDL-Chol (CHOL-HDL) 55 mg/dl   Basic metabolic panel    Collection Time: 05/08/18 10:31 AM   Result Value Ref Range    Sodium 137 136 - 145 mmol/L    Potassium 4 4 3 5 - 5 3 mmol/L    Chloride 105 100 - 108 mmol/L    CO2 26 21 - 32 mmol/L    Anion Gap 6 4 - 13 mmol/L    BUN 43 (H) 5 - 25 mg/dL    Creatinine 1 08 0 60 - 1 30 mg/dL    Glucose, Fasting 164 (H) 65 - 99 mg/dL    Calcium 9 0 8 3 - 10 1 mg/dL    eGFR 64 ml/min/1 73sq m   CBC and differential    Collection Time: 05/14/18 10:31 AM   Result Value Ref Range    WBC 9 19 4 31 - 10 16 Thousand/uL RBC 4 75 3 88 - 5 62 Million/uL    Hemoglobin 14 4 12 0 - 17 0 g/dL    Hematocrit 43 3 36 5 - 49 3 %    MCV 91 82 - 98 fL    MCH 30 3 26 8 - 34 3 pg    MCHC 33 3 31 4 - 37 4 g/dL    RDW 17 0 (H) 11 6 - 15 1 %    Platelets 340 452 - 270 Thousands/uL    nRBC 0 /100 WBCs    Neutrophils Relative 65 43 - 75 %    Lymphocytes Relative 20 14 - 44 %    Monocytes Relative 13 (H) 4 - 12 %    Eosinophils Relative 2 0 - 6 %    Basophils Relative 0 0 - 1 %    Neutrophils Absolute 5 95 1 85 - 7 62 Thousands/µL    Lymphocytes Absolute 1 86 0 60 - 4 47 Thousands/µL    Monocytes Absolute 1 19 0 17 - 1 22 Thousand/µL    Eosinophils Absolute 0 15 0 00 - 0 61 Thousand/µL    Basophils Absolute 0 02 0 00 - 0 10 Thousands/µL   Urinalysis with microscopic    Collection Time: 05/14/18 10:31 AM   Result Value Ref Range    Clarity, UA Cloudy     Color, UA Yellow     Specific Gravity, UA 1 009 1 003 - 1 030    pH, UA 6 5 4 5 - 8 0    Glucose, UA Negative Negative mg/dl    Ketones, UA Negative Negative mg/dl    Blood, UA Moderate (A) Negative    Protein, UA 30 (1+) (A) Negative mg/dl    Nitrite, UA Negative Negative    Bilirubin, UA Negative Negative    Urobilinogen, UA 1 0 0 2, 1 0 E U /dl E U /dl    Leukocytes, UA Large (A) Negative    WBC, UA Innumerable (A) None Seen, 0-5, 5-55, 5-65 /hpf    RBC, UA 4-10 (A) None Seen, 0-5 /hpf    Hyaline Casts, UA None Seen None Seen /lpf    Bacteria, UA Occasional None Seen, Occasional /hpf    Epithelial Cells None Seen None Seen, Occasional /hpf   Urine culture    Collection Time: 05/14/18 10:31 AM   Result Value Ref Range    Urine Culture 60,000-69,000 cfu/ml Escherichia coli (A)        Susceptibility    Escherichia coli - SHANT     Ampicillin ($$) <=8 00 Susceptible ug/ml     Aztreonam ($$$)  <=4 Susceptible ug/ml     Cefazolin ($) <=2 00 Susceptible ug/ml     Ciprofloxacin ($)  <=1 00 Susceptible ug/ml     Gentamicin ($$) <=1 Susceptible ug/ml     Levofloxacin ($) <=0 25 Susceptible ug/ml Nitrofurantoin <=32 Susceptible ug/ml     Tetracycline <=4 Susceptible ug/ml     Tobramycin ($) <=1 Susceptible ug/ml     Trimethoprim + Sulfamethoxazole ($$$) <=2/38 Susceptible ug/ml       1  Type 2 diabetes mellitus with hyperglycemia, with long-term current use of insulin ([facility] )     2  Chronic a-fib ([facility] )     3  Essential hypertension     4  Chronic combined systolic and diastolic congestive heart failure ([facility] )     5  Coronary artery disease involving native heart without angina pectoris, unspecified vessel or lesion type     6  Acute cystitis with hematuria  ciprofloxacin (CIPRO) 500 mg tablet    US kidney and bladder    UA (URINE) with reflex to Microscopic    UA w Reflex to Microscopic w Reflex to Culture -Lab Collect   7  Edema, unspecified type         Orders Placed This Encounter   Procedures    US kidney and bladder    UA (URINE) with reflex to Microscopic    UA w Reflex to Microscopic w Reflex to Culture -Lab Collect         Subjective:  Problems include 1  Diabetes 2  Coronary artery disease 3  Aortic stenosis 4  Atrial fibrillation 5  CHF 6  Stasis edema 7  Recent urinary tract infection  Patient ID: Shasta Escalante is a 81 y o  male  HPI he comes in for follow-up  He was away during the winter  He got a urinary tract infection symptoms about 3 weeks ago  He has burning  He has frequency  He did not seek medical care  He still has some burning but feels better than he did  The following portions of the patient's history were reviewed and updated as appropriate:   He has a past medical history of Aortic stenosis; Atrial fibrillation ([facility] ); Cellulitis of hand, left; Cellulitis of hand, right; CHF (congestive heart failure) ([facility] ); Coronary artery disease; Diabetes mellitus ([facility] ); GI hemorrhage; Gout; Hyperlipidemia; Hypertension; Lung mass; Nonspecific abnormal findings on imaging of lung; Thrombocytopenia ([facility] ); and Vitamin D deficiency  ,   does not have any pertinent problems on file  ,   has a past surgical history that includes Coronary artery bypass graft; Fracture surgery; Cardiac surgery; Joint replacement; Hip surgery (Left); and Knee surgery (Left)  ,  family history includes Cancer in his family; Diabetes in his family; Heart disease in his family; No Known Problems in his father  ,   reports that he has quit smoking  He has never used smokeless tobacco  He reports that he does not drink alcohol or use drugs  ,  is allergic to flovent [fluticasone]; medical tape; and other       Current Outpatient Prescriptions:     apixaban (ELIQUIS) 2 5 mg, Take 1 tablet (2 5 mg total) by mouth 2 (two) times a day , Disp: 60 tablet, Rfl: 0    aspirin 81 MG tablet, Take 1 tablet by mouth daily, Disp: , Rfl:     insulin aspart (NovoLOG) 100 units/mL injection, Inject 30 Units under the skin 3 (three) times a day before meals  , Disp: , Rfl:     insulin glargine (LANTUS) 100 units/mL subcutaneous injection, Inject 50 Units under the skin daily at bedtime  , Disp: , Rfl:     Insulin Syringe-Needle U-100 (ULTICARE INSULIN SYRINGE) 31G X 5/16" 1 ML MISC, by Does not apply route, Disp: , Rfl:     isosorbide mononitrate (ISMO,MONOKET) 10 MG tablet, Take 30 mg by mouth daily, Disp: , Rfl:     KLOR-CON M20 20 MEQ tablet, TAKE 1 TABLET BY MOUTH EVERY 12 HOURS, Disp: 180 tablet, Rfl: 1    metoprolol succinate (TOPROL-XL) 25 mg 24 hr tablet, Take 1 tablet by mouth daily, Disp: , Rfl:     torsemide (DEMADEX) 20 mg tablet, Take 2 tablets (40 mg total) by mouth daily  , Disp: 60 tablet, Rfl: 0    allopurinol (ZYLOPRIM) 300 mg tablet, TAKE 1 TABLET BY MOUTH EVERY DAY, Disp: 90 tablet, Rfl: 0    [START ON 5/22/2018] ciprofloxacin (CIPRO) 500 mg tablet, Take 1 tablet (500 mg total) by mouth every 12 (twelve) hours for 7 days, Disp: 14 tablet, Rfl: 0    colchicine (COLCRYS) 0 6 mg tablet, Take 1 tablet by mouth daily, Disp: , Rfl:     docusate sodium (COLACE) 100 mg capsule, Take 1 capsule by mouth every 12 (twelve) hours for 30 days, Disp: 60 capsule, Rfl: 0    isosorbide mononitrate (IMDUR) 30 mg 24 hr tablet, TAKE 1 TABLET BY MOUTH EVERY DAY, Disp: 90 tablet, Rfl: 2    Review of Systems   Constitutional: Positive for chills  Negative for activity change, appetite change, diaphoresis, fatigue, fever and unexpected weight change  HENT: Negative for congestion, ear pain, hearing loss, mouth sores, nosebleeds, postnasal drip, sinus pain, sinus pressure, sore throat and trouble swallowing  Eyes: Negative for pain, discharge and visual disturbance  Respiratory: Positive for shortness of breath  Negative for apnea, cough, chest tightness and wheezing  Cardiovascular: Positive for leg swelling  Negative for chest pain and palpitations  Gastrointestinal: Negative for abdominal pain, anal bleeding, blood in stool, constipation, diarrhea, nausea and vomiting  Endocrine: Negative for polydipsia and polyphagia  Genitourinary: Positive for frequency and urgency  Negative for decreased urine volume, dysuria, flank pain and hematuria  Musculoskeletal: Negative for arthralgias, back pain, gait problem, joint swelling and myalgias  Skin: Negative for rash and wound  Allergic/Immunologic: Negative for environmental allergies and food allergies  Neurological: Negative for dizziness, tremors, seizures, syncope, speech difficulty, light-headedness, numbness and headaches  Hematological: Negative for adenopathy  Does not bruise/bleed easily  Psychiatric/Behavioral: Negative for agitation, confusion, hallucinations, sleep disturbance and suicidal ideas  The patient is not nervous/anxious  Objective:  Vitals:    05/15/18 0958   BP: 148/62   Pulse: 63   SpO2: 93%     Body mass index is 33 58 kg/m²  Physical Exam   Constitutional: He appears well-developed  No distress  Awake alert and in no acute distress  Afebrile  HENT:   Head: Normocephalic     Right Ear: External ear normal    Left Ear: External ear normal    Nose: Nose normal    Mouth/Throat: Oropharynx is clear and moist  No oropharyngeal exudate  Eyes: Conjunctivae and EOM are normal  Pupils are equal, round, and reactive to light  Right eye exhibits no discharge  Left eye exhibits no discharge  Neck: Normal range of motion  Neck supple  No thyromegaly present  Cardiovascular: Normal rate, normal heart sounds and intact distal pulses  Exam reveals no gallop and no friction rub  No murmur heard  Rhythm irregularly irregular  Pulmonary/Chest: Effort normal and breath sounds normal  No respiratory distress  He has no wheezes  He has no rales  Abdominal: Soft  Bowel sounds are normal  He exhibits no distension and no mass  There is no tenderness  There is no rebound and no guarding  Moderately overweight  Positive bowel sounds  Musculoskeletal: Normal range of motion  He exhibits edema  He exhibits no tenderness or deformity  Lymphadenopathy:     He has no cervical adenopathy  Neurological: He is alert  He has normal reflexes  No cranial nerve deficit  Coordination normal    Skin: Skin is warm and dry  No rash noted  No erythema  Psychiatric: He has a normal mood and affect  His behavior is normal  Judgment and thought content normal    Nursing note and vitals reviewed

## 2018-05-18 DIAGNOSIS — I10 ESSENTIAL HYPERTENSION: Primary | ICD-10-CM

## 2018-05-18 RX ORDER — METOPROLOL SUCCINATE 25 MG/1
TABLET, EXTENDED RELEASE ORAL
Qty: 90 TABLET | Refills: 2 | Status: SHIPPED | OUTPATIENT
Start: 2018-05-18 | End: 2018-05-21 | Stop reason: SDUPTHER

## 2018-05-21 DIAGNOSIS — I25.10 ATHEROSCLEROTIC CARDIOVASCULAR DISEASE: ICD-10-CM

## 2018-05-21 DIAGNOSIS — M10.9 ACUTE GOUTY ARTHROPATHY: Primary | ICD-10-CM

## 2018-05-21 DIAGNOSIS — I48.91 ATRIAL FIBRILLATION, UNSPECIFIED TYPE (HCC): ICD-10-CM

## 2018-05-21 DIAGNOSIS — I10 ESSENTIAL HYPERTENSION: ICD-10-CM

## 2018-05-21 RX ORDER — COLCHICINE 0.6 MG/1
0.6 TABLET ORAL DAILY
Qty: 90 TABLET | Refills: 0 | Status: SHIPPED | OUTPATIENT
Start: 2018-05-21 | End: 2018-09-04 | Stop reason: SDUPTHER

## 2018-05-21 RX ORDER — ISOSORBIDE MONONITRATE 30 MG/1
30 TABLET, EXTENDED RELEASE ORAL DAILY
Qty: 90 TABLET | Refills: 0 | Status: SHIPPED | OUTPATIENT
Start: 2018-05-21 | End: 2018-09-04 | Stop reason: SDUPTHER

## 2018-05-21 RX ORDER — METOPROLOL SUCCINATE 25 MG/1
25 TABLET, EXTENDED RELEASE ORAL DAILY
Qty: 90 TABLET | Refills: 0 | Status: SHIPPED | OUTPATIENT
Start: 2018-05-21 | End: 2018-09-04 | Stop reason: SDUPTHER

## 2018-05-21 NOTE — TELEPHONE ENCOUNTER
NEEDS  ISOSORBIDE MONONITRATE 30MG  COLCHICINE 0 6MG  METOPROLOL SUCCINATE 25MG  ELIQUIS 2 5MG  [place]  [id number]

## 2018-05-23 ENCOUNTER — APPOINTMENT (EMERGENCY)
Dept: ULTRASOUND IMAGING | Facility: HOSPITAL | Age: 83
DRG: 728 | End: 2018-05-23
Payer: MEDICARE

## 2018-05-23 ENCOUNTER — HOSPITAL ENCOUNTER (INPATIENT)
Facility: HOSPITAL | Age: 83
LOS: 3 days | Discharge: HOME | DRG: 728 | End: 2018-05-26
Attending: EMERGENCY MEDICINE | Admitting: INTERNAL MEDICINE
Payer: MEDICARE

## 2018-05-23 ENCOUNTER — OFFICE VISIT (OUTPATIENT)
Dept: INTERNAL MEDICINE CLINIC | Facility: CLINIC | Age: 83
End: 2018-05-23
Payer: MEDICARE

## 2018-05-23 VITALS
TEMPERATURE: 98.2 F | DIASTOLIC BLOOD PRESSURE: 80 MMHG | WEIGHT: 241 LBS | HEIGHT: 70 IN | SYSTOLIC BLOOD PRESSURE: 152 MMHG | OXYGEN SATURATION: 93 % | BODY MASS INDEX: 34.5 KG/M2 | HEART RATE: 65 BPM

## 2018-05-23 DIAGNOSIS — N49.2 SCROTAL ABSCESS: Primary | ICD-10-CM

## 2018-05-23 DIAGNOSIS — N45.1 EPIDIDYMITIS: ICD-10-CM

## 2018-05-23 DIAGNOSIS — D72.829 LEUKOCYTOSIS: ICD-10-CM

## 2018-05-23 DIAGNOSIS — N45.3 EPIDIDYMOORCHITIS: ICD-10-CM

## 2018-05-23 DIAGNOSIS — N50.89 TESTICULAR MASS: Primary | ICD-10-CM

## 2018-05-23 LAB
ANION GAP SERPL CALCULATED.3IONS-SCNC: 8 MMOL/L (ref 4–13)
BACTERIA UR QL AUTO: ABNORMAL /HPF
BASOPHILS # BLD AUTO: 0.05 THOUSANDS/ΜL (ref 0–0.1)
BASOPHILS NFR BLD AUTO: 0 % (ref 0–1)
BILIRUB UR QL STRIP: NEGATIVE
BUN SERPL-MCNC: 55 MG/DL (ref 5–25)
CALCIUM SERPL-MCNC: 8.7 MG/DL (ref 8.3–10.1)
CHLORIDE SERPL-SCNC: 100 MMOL/L (ref 100–108)
CLARITY UR: CLEAR
CO2 SERPL-SCNC: 25 MMOL/L (ref 21–32)
COLOR UR: YELLOW
CREAT SERPL-MCNC: 1.26 MG/DL (ref 0.6–1.3)
EOSINOPHIL # BLD AUTO: 0.06 THOUSAND/ΜL (ref 0–0.61)
EOSINOPHIL NFR BLD AUTO: 1 % (ref 0–6)
ERYTHROCYTE [DISTWIDTH] IN BLOOD BY AUTOMATED COUNT: 16.8 % (ref 11.6–15.1)
GFR SERPL CREATININE-BSD FRML MDRD: 53 ML/MIN/1.73SQ M
GLUCOSE SERPL-MCNC: 153 MG/DL (ref 65–140)
GLUCOSE SERPL-MCNC: 218 MG/DL (ref 65–140)
GLUCOSE UR STRIP-MCNC: NEGATIVE MG/DL
HCT VFR BLD AUTO: 39.7 % (ref 36.5–49.3)
HGB BLD-MCNC: 12.9 G/DL (ref 12–17)
HGB UR QL STRIP.AUTO: ABNORMAL
HYALINE CASTS #/AREA URNS LPF: ABNORMAL /LPF
IMM GRANULOCYTES # BLD AUTO: 0.12 THOUSAND/UL (ref 0–0.2)
IMM GRANULOCYTES NFR BLD AUTO: 1 % (ref 0–2)
KETONES UR STRIP-MCNC: NEGATIVE MG/DL
LEUKOCYTE ESTERASE UR QL STRIP: ABNORMAL
LYMPHOCYTES # BLD AUTO: 1.29 THOUSANDS/ΜL (ref 0.6–4.47)
LYMPHOCYTES NFR BLD AUTO: 10 % (ref 14–44)
MCH RBC QN AUTO: 29.4 PG (ref 26.8–34.3)
MCHC RBC AUTO-ENTMCNC: 32.5 G/DL (ref 31.4–37.4)
MCV RBC AUTO: 90 FL (ref 82–98)
MONOCYTES # BLD AUTO: 1.06 THOUSAND/ΜL (ref 0.17–1.22)
MONOCYTES NFR BLD AUTO: 8 % (ref 4–12)
NEUTROPHILS # BLD AUTO: 10.58 THOUSANDS/ΜL (ref 1.85–7.62)
NEUTS SEG NFR BLD AUTO: 80 % (ref 43–75)
NITRITE UR QL STRIP: NEGATIVE
NON-SQ EPI CELLS URNS QL MICRO: ABNORMAL /HPF
NRBC BLD AUTO-RTO: 0 /100 WBCS
PH UR STRIP.AUTO: 5.5 [PH] (ref 4.5–8)
PLATELET # BLD AUTO: 168 THOUSANDS/UL (ref 149–390)
PMV BLD AUTO: 12.7 FL (ref 8.9–12.7)
POTASSIUM SERPL-SCNC: 4.3 MMOL/L (ref 3.5–5.3)
PROT UR STRIP-MCNC: NEGATIVE MG/DL
RBC # BLD AUTO: 4.39 MILLION/UL (ref 3.88–5.62)
RBC #/AREA URNS AUTO: ABNORMAL /HPF
SODIUM SERPL-SCNC: 133 MMOL/L (ref 136–145)
SP GR UR STRIP.AUTO: 1.01 (ref 1–1.03)
UROBILINOGEN UR QL STRIP.AUTO: 0.2 E.U./DL
WBC # BLD AUTO: 13.16 THOUSAND/UL (ref 4.31–10.16)
WBC #/AREA URNS AUTO: ABNORMAL /HPF

## 2018-05-23 PROCEDURE — 99223 1ST HOSP IP/OBS HIGH 75: CPT | Performed by: INTERNAL MEDICINE

## 2018-05-23 PROCEDURE — 76870 US EXAM SCROTUM: CPT

## 2018-05-23 PROCEDURE — 87040 BLOOD CULTURE FOR BACTERIA: CPT | Performed by: PHYSICIAN ASSISTANT

## 2018-05-23 PROCEDURE — 99222 1ST HOSP IP/OBS MODERATE 55: CPT | Performed by: UROLOGY

## 2018-05-23 PROCEDURE — 81001 URINALYSIS AUTO W/SCOPE: CPT | Performed by: EMERGENCY MEDICINE

## 2018-05-23 PROCEDURE — 80048 BASIC METABOLIC PNL TOTAL CA: CPT | Performed by: EMERGENCY MEDICINE

## 2018-05-23 PROCEDURE — 87086 URINE CULTURE/COLONY COUNT: CPT | Performed by: INTERNAL MEDICINE

## 2018-05-23 PROCEDURE — 85025 COMPLETE CBC W/AUTO DIFF WBC: CPT | Performed by: EMERGENCY MEDICINE

## 2018-05-23 PROCEDURE — 36415 COLL VENOUS BLD VENIPUNCTURE: CPT | Performed by: EMERGENCY MEDICINE

## 2018-05-23 PROCEDURE — 99285 EMERGENCY DEPT VISIT HI MDM: CPT

## 2018-05-23 PROCEDURE — 82948 REAGENT STRIP/BLOOD GLUCOSE: CPT

## 2018-05-23 PROCEDURE — 99214 OFFICE O/P EST MOD 30 MIN: CPT | Performed by: PHYSICIAN ASSISTANT

## 2018-05-23 RX ORDER — TORSEMIDE 20 MG/1
40 TABLET ORAL DAILY
Status: DISCONTINUED | OUTPATIENT
Start: 2018-05-24 | End: 2018-05-26 | Stop reason: HOSPADM

## 2018-05-23 RX ORDER — ALLOPURINOL 300 MG/1
300 TABLET ORAL DAILY
Status: DISCONTINUED | OUTPATIENT
Start: 2018-05-24 | End: 2018-05-26 | Stop reason: HOSPADM

## 2018-05-23 RX ORDER — INSULIN GLARGINE 100 [IU]/ML
16 INJECTION, SOLUTION SUBCUTANEOUS
Status: DISCONTINUED | OUTPATIENT
Start: 2018-05-23 | End: 2018-05-26 | Stop reason: HOSPADM

## 2018-05-23 RX ORDER — ASPIRIN 81 MG/1
81 TABLET, CHEWABLE ORAL DAILY
Status: DISCONTINUED | OUTPATIENT
Start: 2018-05-24 | End: 2018-05-26 | Stop reason: HOSPADM

## 2018-05-23 RX ORDER — METOPROLOL SUCCINATE 25 MG/1
25 TABLET, EXTENDED RELEASE ORAL DAILY
Status: DISCONTINUED | OUTPATIENT
Start: 2018-05-24 | End: 2018-05-26 | Stop reason: HOSPADM

## 2018-05-23 RX ORDER — ISOSORBIDE MONONITRATE 30 MG/1
30 TABLET, EXTENDED RELEASE ORAL DAILY
Status: DISCONTINUED | OUTPATIENT
Start: 2018-05-24 | End: 2018-05-26 | Stop reason: HOSPADM

## 2018-05-23 RX ORDER — DOCUSATE SODIUM 100 MG/1
100 CAPSULE, LIQUID FILLED ORAL EVERY 12 HOURS
Status: DISCONTINUED | OUTPATIENT
Start: 2018-05-23 | End: 2018-05-26 | Stop reason: HOSPADM

## 2018-05-23 RX ORDER — ACETAMINOPHEN 325 MG/1
650 TABLET ORAL EVERY 6 HOURS PRN
Status: DISCONTINUED | OUTPATIENT
Start: 2018-05-23 | End: 2018-05-26 | Stop reason: HOSPADM

## 2018-05-23 RX ORDER — POTASSIUM CHLORIDE 20 MEQ/1
20 TABLET, EXTENDED RELEASE ORAL EVERY 12 HOURS
Status: DISCONTINUED | OUTPATIENT
Start: 2018-05-23 | End: 2018-05-26 | Stop reason: HOSPADM

## 2018-05-23 RX ORDER — MORPHINE SULFATE 2 MG/ML
2 INJECTION, SOLUTION INTRAMUSCULAR; INTRAVENOUS ONCE
Status: COMPLETED | OUTPATIENT
Start: 2018-05-23 | End: 2018-05-23

## 2018-05-23 RX ADMIN — POTASSIUM CHLORIDE 20 MEQ: 1500 TABLET, EXTENDED RELEASE ORAL at 21:12

## 2018-05-23 RX ADMIN — APIXABAN 5 MG: 5 TABLET, FILM COATED ORAL at 21:12

## 2018-05-23 RX ADMIN — MORPHINE SULFATE 2 MG: 2 INJECTION, SOLUTION INTRAMUSCULAR; INTRAVENOUS at 18:36

## 2018-05-23 RX ADMIN — CEFTRIAXONE 1000 MG: 1 INJECTION, SOLUTION INTRAVENOUS at 17:45

## 2018-05-23 RX ADMIN — INSULIN LISPRO 2 UNITS: 100 INJECTION, SOLUTION INTRAVENOUS; SUBCUTANEOUS at 22:51

## 2018-05-23 RX ADMIN — INSULIN GLARGINE 16 UNITS: 100 INJECTION, SOLUTION SUBCUTANEOUS at 22:51

## 2018-05-23 RX ADMIN — DOCUSATE SODIUM 100 MG: 100 CAPSULE, LIQUID FILLED ORAL at 21:12

## 2018-05-23 NOTE — CONSULTS
UROLOGY CONSULTATION NOTE     Patient Identifiers: Aurora Robledo (MRN [id number])  Service Requesting Consultation:  ER  Service Providing Consultation:  Urology, Yenifer Dennis MD    Date of Service: 5/23/2018  Consults    Reason for Consultation:  Right scrotal swelling and erythema    History of Present Illness:     Aurora Robledo is a 81 y o  old male who was recently treated for an E coli UTI by his primary doctor  The patient just finished his 7 day course of ciprofloxacin and on Monday started noticing worsening right scrotal swelling and tenderness  He denies any fevers or chills  He states that his urinary symptoms improved significantly with antibiotics  He has no other complaints  Past Medical, Past Surgical History:     Past Medical History:   Diagnosis Date    Aortic stenosis     Atrial fibrillation (HCC)     Cellulitis of hand, left     Cellulitis of hand, right     [facility]congest[facility]) [facility]     Coronary artery disease     [facility]     GI hemorrhage     Gout     Hyperlipidemia     Hypertension     Lung mass     Nonspecific abnormal findings on imaging of lung     Thrombocytopenia (HCC)     Vitamin D deficiency    :    Past Surgical History:   Procedure Laterality Date    CARDIAC SURGERY      CORONARY ARTERY BYPASS GRAFT      FRACTURE SURGERY      HIP SURGERY Left     JOINT REPLACEMENT      KNEE SURGERY Left    :    Medications, Allergies:     Current Facility-Administered Medications   Medication Dose Route Frequency    cefTRIAXone (ROCEPHIN) IVPB (premix) 1,000 mg  1,000 mg Intravenous Once    morphine injection 2 mg  2 mg Intravenous Once       Allergies: Allergies   Allergen Reactions    Flovent [Fluticasone]     Medical Tape     Other    :    Social and Family History:   Social History:   Social History   Substance Use Topics    Smoking status: Former Smoker    Smokeless tobacco: Never Used    Alcohol use No      Comment: (history)     History   Smoking Status    Former Smoker   Smokeless Tobacco    Never Used       Family History:  Family History   Problem Relation Age of Onset    No Known Problems Father     Cancer Family     Diabetes Family     Heart disease Family    :     Review of Systems:     General: Fever, chills, or night sweats: negative  Cardiac: Negative for chest pain  Pulmonary: Negative for shortness of breath  Gastrointestinal: Abdominal pain negative  Nausea, vomiting, or diarrhea negative,  Genitourinary: See HPI above  Patient does not have hematuria  All other systems queried were negative  Physical Exam:   General: Patient is pleasant and in NAD  Awake and alert  /63 (BP Location: Left arm)   Pulse (!) 53   Temp 98 2 °F (36 8 °C) (Oral)   Resp 19   Wt 105 kg (232 lb)   SpO2 98%   BMI 33 29 kg/m² Temp (24hrs), Av 2 °F (36 8 °C), Min:98 2 °F (36 8 °C), Max:98 2 °F (36 8 °C)  current; Temperature: 98 2 °F (36 8 °C)  No intake/output data recorded  Cardiac: Peripheral edema: negative  Pulmonary: Non-labored breathing  Abdomen: Soft, non-tender, non-distended  No surgical scars  No masses, tenderness, hernias noted  Genitourinary: Negative CVA tenderness, negative suprapubic tenderness  (Male): Penis circumcised, phallus normal, meatus patent  severe erythema of the right hemiscrotum  The right testicle and epididymis are very firm and tender to touch  This testicle is adhered to the scrotal skin      Labs:     Lab Results   Component Value Date    HGB 12 9 2018    HCT 39 7 2018    WBC 13 16 (H) 2018     2018   ]    Lab Results   Component Value Date     (L) 2018    K 4 3 2018     2018    CO2 25 2018    BUN 55 (H) 2018    CREATININE 1 26 2018    CALCIUM 8 7 2018    GLUCOSE 153 (H) 2018   ]    Imaging:   I personally reviewed the images and report of the following studies, and reviewed them with the patient:    US Scrotal: Enlargement of the right epididymis with increased blood flow consistent with epididymitis  There is edema of the scrotal wall  ASSESSMENT:     81 y o  old male with  right epididymo-orchitis after UTI  PLAN:     I had a lengthy discussion with the patient  We discussed that this is likely secondary to the same E coli infection of his bladder  We discussed that he will need a prolonged 2 week course of antibiotics with close follow up with Urology as an outpatient  He will be admitted for IV antibiotics for now  Cephalosporin should be fine  The patient can then be discharged on a long course of Keflex as long as his exam is improving  Thank you for allowing me to participate in this patients care  Please do not hesitate to call with any additional questions  Evelio Mota MD    Total time of encounter was 80 minutes, of which 50 minutes was spent on counseling

## 2018-05-23 NOTE — ED NOTES
Per med surg 4 charge nurse, asked if we can wait till 1915 to send the patient upstairs    Next shift RN, Sera Mccarthy made aware     Aleksandra Vail, PELON  05/23/18 3603

## 2018-05-23 NOTE — ED PROVIDER NOTES
History  Chief Complaint   Patient presents with    Testicle Swelling     Pt c/o right testicle swelling starting Monday, pt reports administering antibiotics for bladder infection, worried it may be connected  Denies any injury/trauma     HPI    Prior to Admission Medications   Prescriptions Last Dose Informant Patient Reported? Taking? Insulin Syringe-Needle U-100 (ULTICARE INSULIN SYRINGE) 31G X 5/16" 1 ML MISC  Self Yes No   Sig: by Does not apply route   KLOR-CON M20 20 MEQ tablet  Self No No   Sig: TAKE 1 TABLET BY MOUTH EVERY 12 HOURS   allopurinol (ZYLOPRIM) 300 mg tablet  Self No No   Sig: TAKE 1 TABLET BY MOUTH EVERY DAY   apixaban (ELIQUIS) 2 5 mg  Self No No   Sig: Take 1 tablet (2 5 mg total) by mouth 2 (two) times a day   aspirin 81 MG tablet  Self Yes No   Sig: Take 1 tablet by mouth daily   ciprofloxacin (CIPRO) 500 mg tablet  Self No No   Sig: Take 1 tablet (500 mg total) by mouth every 12 (twelve) hours for 7 days   colchicine (COLCRYS) 0 6 mg tablet  Self No No   Sig: Take 1 tablet (0 6 mg total) by mouth daily   docusate sodium (COLACE) 100 mg capsule   No No   Sig: Take 1 capsule by mouth every 12 (twelve) hours for 30 days   insulin aspart (NovoLOG) 100 units/mL injection  Self Yes No   Sig: Inject 30 Units under the skin 3 (three) times a day before meals  insulin degludec (TRESIBA) injection pen 100 units/mL   Yes No   Sig: Inject under the skin daily 16 UNIT DAILY AT BEDTIME   insulin glargine (LANTUS) 100 units/mL subcutaneous injection  Self Yes No   Sig: Inject 50 Units under the skin daily at bedtime     isosorbide mononitrate (IMDUR) 30 mg 24 hr tablet  Self No No   Sig: Take 1 tablet (30 mg total) by mouth daily   isosorbide mononitrate (ISMO,MONOKET) 10 MG tablet  Self Yes No   Sig: Take 30 mg by mouth daily   metoprolol succinate (TOPROL-XL) 25 mg 24 hr tablet  Self No No   Sig: Take 1 tablet (25 mg total) by mouth daily   torsemide (DEMADEX) 20 mg tablet  Self No No   Sig: Take 2 tablets (40 mg total) by mouth daily  Facility-Administered Medications: None       Past Medical History:   Diagnosis Date    Aortic stenosis     Atrial fibrillation (HCC)     Cellulitis of hand, left     Cellulitis of hand, right     [facility]     Coronary artery disease     Diabetes mellitus ([facility] )     GI hemorrhage     Gout     Hyperlipidemia     Hypertension     Lung mass     Nonspecific abnormal findings on imaging of lung     Thrombocytopenia (HCC)     Vitamin D deficiency        Past Surgical History:   Procedure Laterality Date    CARDIAC SURGERY      CORONARY ARTERY BYPASS GRAFT      FRACTURE SURGERY      HIP SURGERY Left     JOINT REPLACEMENT      KNEE SURGERY Left        Family History   Problem Relation Age of Onset    No Known Problems Father     Cancer Family     Diabetes Family     Heart disease Family      I have reviewed and agree with the history as documented  Social History   Substance Use Topics    Smoking status: Former Smoker    Smokeless tobacco: Never Used    Alcohol use No      Comment: (history)        Review of Systems    Physical Exam  Physical Exam   Constitutional: He is oriented to person, place, and time  He appears well-developed and well-nourished  No distress  HENT:   Head: Normocephalic and atraumatic  Mouth/Throat: Oropharynx is clear and moist    Eyes: Conjunctivae are normal  Pupils are equal, round, and reactive to light  Neck: Normal range of motion  No tracheal deviation present  Cardiovascular: Normal rate, regular rhythm, normal heart sounds and intact distal pulses  Pulmonary/Chest: Effort normal and breath sounds normal  No respiratory distress  Abdominal: Soft  Bowel sounds are normal  He exhibits no distension  There is no tenderness  Hernia confirmed negative in the right inguinal area and confirmed negative in the left inguinal area     Genitourinary: Right testis shows mass (anterior), swelling and tenderness  Circumcised  Lymphadenopathy: No inguinal adenopathy noted on the right or left side  Neurological: He is alert and oriented to person, place, and time  GCS eye subscore is 4  GCS verbal subscore is 5  GCS motor subscore is 6  Skin: Skin is warm and dry  Psychiatric: He has a normal mood and affect  His behavior is normal    Nursing note and vitals reviewed        Vital Signs  ED Triage Vitals [05/23/18 1513]   Temperature Pulse Respirations Blood Pressure SpO2   98 2 °F (36 8 °C) (!) 42 19 153/63 98 %      Temp Source Heart Rate Source Patient Position - Orthostatic VS BP Location FiO2 (%)   Oral Monitor Sitting Left arm --      Pain Score       --           Vitals:    05/23/18 1513 05/23/18 1516   BP: 153/63    Pulse: (!) 42 (!) 53   Patient Position - Orthostatic VS: Sitting        Visual Acuity      ED Medications  Medications   cefTRIAXone (ROCEPHIN) IVPB (premix) 1,000 mg (not administered)       Diagnostic Studies  Results Reviewed     Procedure Component Value Units Date/Time    Urine Microscopic [[id number]]  (Abnormal) Collected:  05/23/18 1614    Lab Status:  Final result Specimen:  Urine from Urine, Clean Catch Updated:  05/23/18 1634     RBC, UA 2-4 (A) /hpf      WBC, UA 4-10 (A) /hpf      Epithelial Cells Occasional /hpf      Bacteria, UA None Seen /hpf      Hyaline Casts, UA 0-1 (A) /lpf     Basic metabolic panel [[id number]]  (Abnormal) Collected:  05/23/18 1613    Lab Status:  Final result Specimen:  Blood from Arm, Right Updated:  05/23/18 1628     Sodium 133 (L) mmol/L      Potassium 4 3 mmol/L      Chloride 100 mmol/L      CO2 25 mmol/L      Anion Gap 8 mmol/L      BUN 55 (H) mg/dL      Creatinine 1 26 mg/dL      Glucose 153 (H) mg/dL      Calcium 8 7 mg/dL      eGFR 53 ml/min/1 73sq m     Narrative:         National Kidney Disease Education Program recommendations are as follows:  GFR calculation is accurate only with a steady state creatinine  Chronic Kidney disease less than 60 ml/min/1 73 sq  meters  Kidney failure less than 15 ml/min/1 73 sq  meters  UA w Reflex to Microscopic w Reflex to Culture [[id number]]  (Abnormal) Collected:  05/23/18 1614    Lab Status:  Final result Specimen:  Urine from Urine, Clean Catch Updated:  05/23/18 1622     Color, UA Yellow     Clarity, UA Clear     Specific Gravity, UA 1 010     pH, UA 5 5     Leukocytes, UA Small (A)     Nitrite, UA Negative     Protein, UA Negative mg/dl      Glucose, UA Negative mg/dl      Ketones, UA Negative mg/dl      Urobilinogen, UA 0 2 E U /dl      Bilirubin, UA Negative     Blood, UA Small (A)    CBC and differential [[id number]]  (Abnormal) Collected:  05/23/18 1613    Lab Status:  Final result Specimen:  Blood from Arm, Right Updated:  05/23/18 1619     WBC 13 16 (H) Thousand/uL      RBC 4 39 Million/uL      Hemoglobin 12 9 g/dL      Hematocrit 39 7 %      MCV 90 fL      MCH 29 4 pg      MCHC 32 5 g/dL      RDW 16 8 (H) %      MPV 12 7 fL      Platelets 674 Thousands/uL      nRBC 0 /100 WBCs      Neutrophils Relative 80 (H) %      Immat GRANS % 1 %      Lymphocytes Relative 10 (L) %      Monocytes Relative 8 %      Eosinophils Relative 1 %      Basophils Relative 0 %      Neutrophils Absolute 10 58 (H) Thousands/µL      Immature Grans Absolute 0 12 Thousand/uL      Lymphocytes Absolute 1 29 Thousands/µL      Monocytes Absolute 1 06 Thousand/µL      Eosinophils Absolute 0 06 Thousand/µL      Basophils Absolute 0 05 Thousands/µL                  US scrotum and testicles   Final Result by Ignacio Sigala MD (05/23 1712)          1  Markedly enlarged and hyperemic right epididymis consistent with epididymitis  Complex collection in the head of the epididymis measuring 1 2 cm likely to represent an organizing abscess  No evidence of testicular torsion or orchitis  2   Small right hydrocele, likely reactive  3   Marked inflammation or edema in the right aspect of the scrotum        The study was marked in EPIC for immediate notification  Workstation performed: [id number]                    Procedures  Procedures       Phone Contacts  ED Phone Contact    ED Course                               MDM  Number of Diagnoses or Management Options  Epididymitis: new and requires workup  Leukocytosis: new and requires workup  Scrotal abscess: new and requires workup  Diagnosis management comments: This is an 59-year-old male who presents here today with right testicular pain  He states about a week ago he was seen at his primary care doctor's office and was started on antibiotics to treat a urinary tract infection  He states he woke up on 05/21 with his right testicle being swollen, painful, and red  He states it seems to be slightly better today, in that he cannot touch the area, but it is still causing him significant discomfort  Has been taking Tylenol for pain, which she states is not helping  He says sometimes when it is particularly bad it will radiate up into his abdomen, but denies any true abdominal pain  He denies any fevers, nausea, vomiting, changes in his bowels  He was started on ciprofloxacin for the urinary tract infection, and states his urine seems to be clearing when he looks at it, and his dysuria has resolved  He denies any injuries to his testicles, rashes to the area, prior similar problems  ROS: Otherwise negative, unless stated as above  He is well-appearing, in no acute distress  His scrotum, primarily on the right side, is swelling, tender, and in the anterior portion of the scrotum there is a firm area, but this is not more tender than the rest of his scrotum  He has no external lesions or rashes  There is no spread outside of the scrotum  He has no crepitus  There is no groin swelling or tenderness, or palpable groin hernias  He has no abdominal tenderness  The remainder of his exam is unremarkable  We will check lab work and ultrasound to evaluate    The patient declines any pain medication at this point in time  He had urine checked on 05/14, which showed moderate blood, large leukocytes, innumerable white blood cells with occasional bacteria and 4-10 red blood cells  The urine culture grew out 60-69,000 E coli which was pan susceptible  He was treated with b i d  Cipro for this  The urine today shows improvement of his infection, with no bacteria and 4-10 white blood cells  He does have a leukocytosis on CBC of 13  The remainder of his labs are unremarkable  His ultrasound shows epididymitis with a developing abscess of about 1 2 centimeters  Given his multiple medical problems and recent outpatient antibiotics, we will admit him for IV antibiotics  I spoke with Pola Briggs, the NP on for Urology, who would recommend Rocephin as a second-line agent  Her attending is currently in the hospital finishing either case, and she will have him see the patient, to determine whether this can be I and D at bedside or whether it needs surgical drainage, and if so when  I did update the patient and his son on findings and plan of care, and they expressed understanding  We will admit him to the hospitalist service for further management         Amount and/or Complexity of Data Reviewed  Clinical lab tests: ordered and reviewed  Tests in the radiology section of CPT®: ordered and reviewed  Decide to obtain previous medical records or to obtain history from someone other than the patient: yes  Review and summarize past medical records: yes  Discuss the patient with other providers: yes      CritCare Time    Disposition  Final diagnoses:   Scrotal abscess - right   Epididymitis   Leukocytosis     Time reflects when diagnosis was documented in both MDM as applicable and the Disposition within this note     Time User Action Codes Description Comment    5/23/2018  5:18 PM Birgit Chauhan Add [N49 2] Scrotal abscess     5/23/2018  5:18 PM Birgit Chauhan Add [N45 1] Epididymitis     5/23/2018  5:18 PM eBcky Chauhan Modify [N49 2] Scrotal abscess right    5/23/2018  5:19 PM Becky Chauhan Add [X39 541] Leukocytosis       ED Disposition     ED Disposition Condition Comment    Admit  Case was discussed with Dr Macrina Hollins and the patient's admission status was agreed to be Admission Status: inpatient status to the service of Dr Macrina Hollins         Follow-up Information    None         Patient's Medications   Discharge Prescriptions    No medications on file     No discharge procedures on file      ED Provider  Electronically Signed by           Adrian Alves MD  05/23/18 1458

## 2018-05-23 NOTE — PROGRESS NOTES
Assessment/Plan:  Tender Large firm right testicular mass with reddened scrotum possibly abscess hematoma etc he is referred to the emergency room       Diagnoses and all orders for this visit:    Testicular mass    Other orders  -     insulin degludec (TRESIBA) injection pen 100 units/mL; Inject under the skin daily 16 UNIT DAILY AT BEDTIME        No problem-specific Assessment & Plan notes found for this encounter  Subjective:      Patient ID: Marnie Will is a 81 y o  male  He developed pain redness and severe tenderness right testicle 2 days ago  Two weeks ago he had urinary burning he finish antibiotics for UTI last week he had some E coli in his urine  He has no further burning with urination but he developed acute pain swelling in the right testicle as noted  He has chronic edema of his legs aortic fibrillation aortic stenosis chronically anticoagulated      Testicle Pain   The patient's primary symptoms include testicular pain  Pertinent negatives include no abdominal pain, chest pain, chills, constipation, coughing, diarrhea, dysuria, fever, flank pain, frequency, headaches, nausea, rash, shortness of breath, sore throat, urgency or vomiting  The following portions of the patient's history were reviewed and updated as appropriate:   He has a past medical history of Aortic stenosis; Atrial fibrillation ([address] ); Cellulitis of hand, left; Cellulitis of hand, right; CHF (congestive heart failure) ([address] ); Coronary artery disease; Diabetes mellitus ([address] ); GI hemorrhage; Gout; Hyperlipidemia; Hypertension; Lung mass; Nonspecific abnormal findings on imaging of lung; Thrombocytopenia ([address] ); and Vitamin D deficiency  ,   does not have any pertinent problems on file  ,   has a past surgical history that includes Coronary artery bypass graft; Fracture surgery; Cardiac surgery; Joint replacement; Hip surgery (Left); and Knee surgery (Left)  ,  family history includes Cancer in his family; Diabetes in his family; Heart disease in his family; No Known Problems in his father  ,   reports that he has quit smoking  He has never used smokeless tobacco  He reports that he does not drink alcohol or use drugs  ,  is allergic to flovent [fluticasone]; medical tape; and other     No current facility-administered medications for this visit  Current Outpatient Prescriptions   Medication Sig Dispense Refill    allopurinol (ZYLOPRIM) 300 mg tablet TAKE 1 TABLET BY MOUTH EVERY DAY 90 tablet 0    apixaban (ELIQUIS) 2 5 mg Take 1 tablet (2 5 mg total) by mouth 2 (two) times a day 180 tablet 0    aspirin 81 MG tablet Take 1 tablet by mouth daily      ciprofloxacin (CIPRO) 500 mg tablet Take 1 tablet (500 mg total) by mouth every 12 (twelve) hours for 7 days 14 tablet 0    colchicine (COLCRYS) 0 6 mg tablet Take 1 tablet (0 6 mg total) by mouth daily 90 tablet 0    insulin aspart (NovoLOG) 100 units/mL injection Inject 30 Units under the skin 3 (three) times a day before meals   insulin degludec (TRESIBA) injection pen 100 units/mL Inject under the skin daily 16 UNIT DAILY AT BEDTIME      Insulin Syringe-Needle U-100 (ULTICARE INSULIN SYRINGE) 31G X 5/16" 1 ML MISC by Does not apply route      isosorbide mononitrate (IMDUR) 30 mg 24 hr tablet Take 1 tablet (30 mg total) by mouth daily 90 tablet 0    isosorbide mononitrate (ISMO,MONOKET) 10 MG tablet Take 30 mg by mouth daily      KLOR-CON M20 20 MEQ tablet TAKE 1 TABLET BY MOUTH EVERY 12 HOURS 180 tablet 1    metoprolol succinate (TOPROL-XL) 25 mg 24 hr tablet Take 1 tablet (25 mg total) by mouth daily 90 tablet 0    torsemide (DEMADEX) 20 mg tablet Take 2 tablets (40 mg total) by mouth daily  60 tablet 0    docusate sodium (COLACE) 100 mg capsule Take 1 capsule by mouth every 12 (twelve) hours for 30 days 60 capsule 0    insulin glargine (LANTUS) 100 units/mL subcutaneous injection Inject 50 Units under the skin daily at bedtime         Facility-Administered Medications Ordered in Other Visits   Medication Dose Route Frequency Provider Last Rate Last Dose    morphine injection 2 mg  2 mg Intravenous Once Monet Johnson MD           Review of Systems   Constitutional: Negative for activity change, appetite change, chills, diaphoresis, fatigue, fever and unexpected weight change  HENT: Negative for congestion, dental problem, drooling, ear discharge, ear pain, facial swelling, hearing loss, nosebleeds, postnasal drip, rhinorrhea, sinus pain, sinus pressure, sneezing, sore throat, tinnitus, trouble swallowing and voice change  Eyes: Negative for photophobia, pain, discharge, redness, itching and visual disturbance  Respiratory: Negative for apnea, cough, choking, chest tightness, shortness of breath, wheezing and stridor  Cardiovascular: Positive for leg swelling  Negative for chest pain and palpitations  Gastrointestinal: Negative for abdominal distention, abdominal pain, anal bleeding, blood in stool, constipation, diarrhea, nausea, rectal pain and vomiting  Endocrine: Negative for cold intolerance, heat intolerance, polydipsia, polyphagia and polyuria  Genitourinary: Positive for testicular pain  Negative for decreased urine volume, difficulty urinating, dysuria, enuresis, flank pain, frequency, genital sores, hematuria and urgency  Musculoskeletal: Negative for arthralgias, back pain, gait problem, joint swelling, myalgias, neck pain and neck stiffness  Skin: Negative for color change, pallor, rash and wound  Neurological: Negative for dizziness, tremors, seizures, syncope, facial asymmetry, speech difficulty, weakness, light-headedness, numbness and headaches  Hematological: Negative for adenopathy  Does not bruise/bleed easily  Psychiatric/Behavioral: Negative for agitation, behavioral problems, confusion, decreased concentration, dysphoric mood, hallucinations, self-injury, sleep disturbance and suicidal ideas   The patient is not nervous/anxious and is not hyperactive  Objective:  Vitals:    05/23/18 1433   BP: 152/80   BP Location: Left arm   Patient Position: Sitting   Pulse: 65   Temp: 98 2 °F (36 8 °C)   SpO2: 93%   Weight: 109 kg (241 lb)   Height: 5' 10" (1 778 m)     Body mass index is 34 58 kg/m²  Physical Exam   Constitutional: He is oriented to person, place, and time  He appears well-developed  HENT:   Head: Normocephalic  Right Ear: External ear normal    Left Ear: External ear normal    Mouth/Throat: Oropharynx is clear and moist    Eyes: Conjunctivae are normal  Pupils are equal, round, and reactive to light  Neck: Neck supple  No thyromegaly present  Cardiovascular: Normal rate and intact distal pulses  Murmur heard  Pulmonary/Chest: Effort normal and breath sounds normal    Abdominal: Soft  Bowel sounds are normal  He exhibits no distension and no mass  There is tenderness (Swollen reddened very tender firm right testicle)  There is no rebound and no guarding  Musculoskeletal: Normal range of motion  He exhibits edema (3+ bilateral)  He exhibits no tenderness or deformity  Neurological: He is alert and oriented to person, place, and time  He has normal reflexes  Skin: Skin is warm and dry

## 2018-05-23 NOTE — Clinical Note
Case was discussed with Catrachita and the patient's admission status was agreed to be Admission Status: inpatient status to the service of

## 2018-05-23 NOTE — ED NOTES
Pt reports bradycardia is "normal because of my a-fib " Charge nurse made aware       Kin Camacho RN  05/23/18 2696

## 2018-05-24 PROBLEM — N45.3 EPIDIDYMOORCHITIS: Status: ACTIVE | Noted: 2018-05-23

## 2018-05-24 PROBLEM — I89.0 LYMPHEDEMA OF BOTH LOWER EXTREMITIES: Status: ACTIVE | Noted: 2018-05-24

## 2018-05-24 LAB
ANION GAP SERPL CALCULATED.3IONS-SCNC: 9 MMOL/L (ref 4–13)
BACTERIA UR CULT: NORMAL
BUN SERPL-MCNC: 51 MG/DL (ref 5–25)
CALCIUM SERPL-MCNC: 8.8 MG/DL (ref 8.3–10.1)
CHLORIDE SERPL-SCNC: 101 MMOL/L (ref 100–108)
CO2 SERPL-SCNC: 25 MMOL/L (ref 21–32)
CREAT SERPL-MCNC: 1.25 MG/DL (ref 0.6–1.3)
ERYTHROCYTE [DISTWIDTH] IN BLOOD BY AUTOMATED COUNT: 16.8 % (ref 11.6–15.1)
GFR SERPL CREATININE-BSD FRML MDRD: 53 ML/MIN/1.73SQ M
GLUCOSE SERPL-MCNC: 133 MG/DL (ref 65–140)
GLUCOSE SERPL-MCNC: 139 MG/DL (ref 65–140)
GLUCOSE SERPL-MCNC: 150 MG/DL (ref 65–140)
GLUCOSE SERPL-MCNC: 215 MG/DL (ref 65–140)
GLUCOSE SERPL-MCNC: 229 MG/DL (ref 65–140)
HCT VFR BLD AUTO: 40.6 % (ref 36.5–49.3)
HGB BLD-MCNC: 13.3 G/DL (ref 12–17)
MCH RBC QN AUTO: 29.5 PG (ref 26.8–34.3)
MCHC RBC AUTO-ENTMCNC: 32.8 G/DL (ref 31.4–37.4)
MCV RBC AUTO: 90 FL (ref 82–98)
PLATELET # BLD AUTO: 185 THOUSANDS/UL (ref 149–390)
PMV BLD AUTO: 13.4 FL (ref 8.9–12.7)
POTASSIUM SERPL-SCNC: 4.2 MMOL/L (ref 3.5–5.3)
RBC # BLD AUTO: 4.51 MILLION/UL (ref 3.88–5.62)
SODIUM SERPL-SCNC: 135 MMOL/L (ref 136–145)
WBC # BLD AUTO: 11.15 THOUSAND/UL (ref 4.31–10.16)

## 2018-05-24 PROCEDURE — 82948 REAGENT STRIP/BLOOD GLUCOSE: CPT

## 2018-05-24 PROCEDURE — 97163 PT EVAL HIGH COMPLEX 45 MIN: CPT

## 2018-05-24 PROCEDURE — 85027 COMPLETE CBC AUTOMATED: CPT | Performed by: PHYSICIAN ASSISTANT

## 2018-05-24 PROCEDURE — 99233 SBSQ HOSP IP/OBS HIGH 50: CPT | Performed by: INTERNAL MEDICINE

## 2018-05-24 PROCEDURE — 80048 BASIC METABOLIC PNL TOTAL CA: CPT | Performed by: PHYSICIAN ASSISTANT

## 2018-05-24 PROCEDURE — G8979 MOBILITY GOAL STATUS: HCPCS

## 2018-05-24 PROCEDURE — G8978 MOBILITY CURRENT STATUS: HCPCS

## 2018-05-24 PROCEDURE — 99232 SBSQ HOSP IP/OBS MODERATE 35: CPT | Performed by: NURSE PRACTITIONER

## 2018-05-24 RX ORDER — TRAMADOL HYDROCHLORIDE 50 MG/1
50 TABLET ORAL EVERY 6 HOURS PRN
Status: DISCONTINUED | OUTPATIENT
Start: 2018-05-24 | End: 2018-05-26 | Stop reason: HOSPADM

## 2018-05-24 RX ADMIN — INSULIN LISPRO 1 UNITS: 100 INJECTION, SOLUTION INTRAVENOUS; SUBCUTANEOUS at 12:04

## 2018-05-24 RX ADMIN — APIXABAN 5 MG: 5 TABLET, FILM COATED ORAL at 08:28

## 2018-05-24 RX ADMIN — POTASSIUM CHLORIDE 20 MEQ: 1500 TABLET, EXTENDED RELEASE ORAL at 21:11

## 2018-05-24 RX ADMIN — INSULIN LISPRO 2 UNITS: 100 INJECTION, SOLUTION INTRAVENOUS; SUBCUTANEOUS at 23:09

## 2018-05-24 RX ADMIN — POTASSIUM CHLORIDE 20 MEQ: 1500 TABLET, EXTENDED RELEASE ORAL at 08:27

## 2018-05-24 RX ADMIN — TORSEMIDE 40 MG: 20 TABLET ORAL at 08:28

## 2018-05-24 RX ADMIN — TRAMADOL HYDROCHLORIDE 50 MG: 50 TABLET, COATED ORAL at 06:49

## 2018-05-24 RX ADMIN — DOCUSATE SODIUM 100 MG: 100 CAPSULE, LIQUID FILLED ORAL at 21:11

## 2018-05-24 RX ADMIN — DOCUSATE SODIUM 100 MG: 100 CAPSULE, LIQUID FILLED ORAL at 08:27

## 2018-05-24 RX ADMIN — ASPIRIN 81 MG 81 MG: 81 TABLET ORAL at 08:28

## 2018-05-24 RX ADMIN — APIXABAN 5 MG: 5 TABLET, FILM COATED ORAL at 17:09

## 2018-05-24 RX ADMIN — ISOSORBIDE MONONITRATE 30 MG: 30 TABLET, EXTENDED RELEASE ORAL at 08:31

## 2018-05-24 RX ADMIN — INSULIN GLARGINE 16 UNITS: 100 INJECTION, SOLUTION SUBCUTANEOUS at 23:08

## 2018-05-24 RX ADMIN — METOPROLOL SUCCINATE 25 MG: 25 TABLET, EXTENDED RELEASE ORAL at 08:28

## 2018-05-24 RX ADMIN — CEFTRIAXONE 2000 MG: 2 INJECTION, SOLUTION INTRAVENOUS at 17:14

## 2018-05-24 RX ADMIN — INSULIN LISPRO 2 UNITS: 100 INJECTION, SOLUTION INTRAVENOUS; SUBCUTANEOUS at 17:10

## 2018-05-24 RX ADMIN — ALLOPURINOL 300 MG: 300 TABLET ORAL at 08:29

## 2018-05-24 NOTE — ASSESSMENT & PLAN NOTE
· Does not appear to be in acute exacerbation  · Last ECHO 5/10 with EF of 40-45%  · Has chronic lower extremity edema, encourage leg elevation and stockings

## 2018-05-24 NOTE — PLAN OF CARE
Problem: PHYSICAL THERAPY ADULT  Goal: Performs mobility at highest level of function for planned discharge setting  See evaluation for individualized goals  Treatment/Interventions: Functional transfer training, Elevations, Therapeutic exercise, Endurance training, Gait training, Spoke to nursing  Equipment Recommended: Robel Has       See flowsheet documentation for full assessment, interventions and recommendations  Prognosis: Good  Problem List: Decreased endurance, Impaired balance, Decreased mobility, Pain  Assessment: Pt is 81 y o  male seen for PT evaluation on 5/24/2018 s/p admit to [facility] on 5/23/2018 w/ Epididymoorchitis  PT consulted to assess pt's functional mobility and d/c needs  Order placed for PT eval and tx, w/ up w/ A order  Performed at least 2 patient identifiers during session: Name and wristband  Comorbidities affecting pt's physical performance at time of assessment include: DM type 2, CAD s/p CABG, AFib, HTN, CHF, lymphedema of B LEs, chronic peripheral edema who presents w/ R testicular swelling x 2 days  PTA, pt was independent w/ all functional mobility w/ SPC, ambulates community distances and elevations, has 6-7 [address] ALICE, lives w/ son in 2 level home, retired and has a first floor setup  Personal factors affecting pt at time of IE include: ambulating w/ assistive device and stairs to enter home  Please find objective findings from PT assessment regarding body systems outlined above with impairments and limitations including impaired balance, decreased endurance, gait deviations, pain, decreased activity tolerance, decreased functional mobility tolerance and fall risk, as well as mobility assessment (need for supervision level of assist w/ all phases of mobility when usually ambulating independently)  The following objective measures performed on IE also reveal limitations: Barthel Index: 65/100 and Modified [place]: 2 (slight disability)   Pt's clinical presentation is currently unstable/unpredictable seen in pt's presentation of severe pain impacting overall mobility status  Pt to benefit from continued PT tx to address deficits as defined above and maximize level of functional independent mobility and consistency  From PT/mobility standpoint, recommendation at time of d/c would be Home PT vs  anticipate no needs pending progress in order to facilitate return to PLOF  Barriers to Discharge: Inaccessible home environment (6-7 [address]ICE home)     Recommendation: Home PT, Home with family support (vs  no needs)     PT - OK to Discharge: Yes (when medically cleared)    See flowsheet documentation for full assessment

## 2018-05-24 NOTE — PLAN OF CARE
DISCHARGE PLANNING     Discharge to home or other facility with appropriate resources Progressing        GENITOURINARY - ADULT     Maintains or returns to baseline urinary function Progressing        INFECTION - ADULT     Absence or prevention of progression during hospitalization Progressing        Knowledge Deficit     Patient/family/caregiver demonstrates understanding of disease process, treatment plan, medications, and discharge instructions Progressing        MUSCULOSKELETAL - ADULT     Maintain or return mobility to safest level of function Progressing        PAIN - ADULT     Verbalizes/displays adequate comfort level or baseline comfort level Progressing        SAFETY ADULT     Patient will remain free of falls Progressing     Maintain or return to baseline ADL function Progressing     Maintain or return mobility status to optimal level Progressing        SKIN/TISSUE INTEGRITY - ADULT     Skin integrity remains intact Progressing

## 2018-05-24 NOTE — PROGRESS NOTES
[facility] Progress Note  Patient: Shasta Escalante 81 y o  male   MRN: [id number]  PCP: Ingrid Cameron DO  Unit/Bed#: [facility] Encounter: [id number]  Date Of Visit: 05/24/18    Assessment/Plan:    Principal Problem:    Epididymoorchitis  Active Problems:    Chronic a-fib ([address] )    Type 2 diabetes mellitus with hyperglycemia ([facility] )    Essential hypertension    Chronic combined systolic and diastolic congestive heart failure (HCC)    Edema    Hyponatremia    Lymphedema of both lower extremities    Present on Admission:   Epididymoorchitis   Type 2 diabetes mellitus with hyperglycemia ([address] )   Essential hypertension   Chronic a-fib ([facility] [address] [facility]   Edema   Chronic combined systolic and diastolic congestive heart failure (HCC)   Hyponatremia   Lymphedema of both lower extremities      · Epididymo-orchitis  -UTI-present on admission  Continue with IV antibiotics  Clinically appears slightly better  Advised him to try to elevate his legs/scrotum  Urology following  · Chronic lymphedema  Discussed with staff  Okay to move his lymphedema pump  · Hypernatremia  Hemodynamic  Continue to monitor  · History of atrial fibrillation  Also on anticoagulation chronically with Eliquis  Stable  · Diabetes mellitus type  Continue with current treatment including sliding scale  · Essential hypertension  Continue with home medications  · Chronic kidney disease stage III  Continue to monitor labs      VTE Pharmacologic Prophylaxis:   Pharmacologic: Apixaban (Eliquis)  Mechanical VTE Prophylaxis in Place: Yes    Patient Centered Rounds: I have performed bedside rounds with nursing staff today      Discussions with Specialists or Other Care Team Provider:  Yes    Education and Discussions with Family / Patient:  Yes        Current Length of Stay: 1 day(s)    Current Patient Status: Inpatient   Certification Statement: The patient will continue to require additional inpatient hospital stay due to IV antibiotics    Discharge Plan:  Home when stable    Code Status: Level 1 - Full Code      Subjective:   Feels little better with less soreness of his scrotum/testicles especially the right side and he is able to ambulate somewhat  No chest pain or shortness of breath  No fever or chills    Objective:     Vitals:   Temp (24hrs), Av 3 °F (36 8 °C), Min:98 °F (36 7 °C), Max:98 7 °F (37 1 °C)    HR:  [52-68] 54  Resp:  [17-18] 18  BP: (121-154)/(57-71) 126/59  SpO2:  [93 %-99 %] 93 %  Body mass index is 34 89 kg/m²  Input and Output Summary (last 24 hours): Intake/Output Summary (Last 24 hours) at 18 1532  Last data filed at 18 0526   Gross per 24 hour   Intake              170 ml   Output              500 ml   Net             -330 ml           Physical Exam:     Vital signs reviewed as above  Patient up in the room and was able to walk and go to the bathroom and then came back in the bed and able to lay flat  Not in any respiratory distress  S1 and S2 audible  Did not hear any rubs or gallop  He has irregularly irregular rhythm  Abdomen is obese  It is nontender  Bowel sounds are audible  Scrotum:  Still quite tender specially the right side also read although less swollen as compared to yesterday  Extremities:  Bilateral significant lymphedema with chronic stasis dermatitis skin changes and also some open areas where he has dressing on  Mood and affect are pleasant  Awake and alert    Oriented x3      Additional Data:     Labs:      Results from last 7 days  Lab Units 18  0448 18  1613   WBC Thousand/uL 11 15* 13 16*   HEMOGLOBIN g/dL 13 3 12 9   HEMATOCRIT % 40 6 39 7   PLATELETS Thousands/uL 185 168   NEUTROS PCT %  --  80*   LYMPHS PCT %  --  10*   MONOS PCT %  --  8   EOS PCT %  --  1       Results from last 7 days  Lab Units 18  0448   SODIUM mmol/L 135*   POTASSIUM mmol/L 4 2   CHLORIDE mmol/L 101   CO2 mmol/L 25   BUN mg/dL 51*   CREATININE mg/dL 1 25 CALCIUM mg/dL 8 8   GLUCOSE RANDOM mg/dL 139           * I Have Reviewed All Lab Data Listed Above  * Additional Pertinent Lab Tests Reviewed: All Labs Within Last 24 Hours Reviewed      Recent Cultures (last 7 days):           Last 24 Hours Medication List:     Current Facility-Administered Medications:  acetaminophen 650 mg Oral Q6H PRN Leona Barnhart PA-C   allopurinol 300 mg Oral Daily KOTA Durant-C   apixaban 5 mg Oral BID KOTA Durant-TAWANNA   aspirin 81 mg Oral Daily KOTA Durant-TAWANNA   cefTRIAXone 2,000 mg Intravenous Q24H Modesto Jarvis MD   docusate sodium 100 mg Oral Q12H KOTA Durant-TAWANNA   insulin glargine 16 Units Subcutaneous HS KOTA Durant-TAWANNA   insulin lispro 1-5 Units Subcutaneous TID AC KOTA Durant-TAWANNA   insulin lispro 1-5 Units Subcutaneous HS KOTA Durant-C   isosorbide mononitrate 30 mg Oral Daily Minerva B Becca PA-C   metoprolol succinate 25 mg Oral Daily Minerva Hudson, PA-C   potassium chloride 20 mEq Oral Q12H KOTA Durant-C   torsemide 40 mg Oral Daily Minerva B Becca, PA-C   traMADol 50 mg Oral Q6H PRN Abena Goode MD        Today, Patient Was Seen By: Modesto Jarvis MD    ** Please Note: Dragon 360 Dictation voice to text software may have been used in the creation of this document   **

## 2018-05-24 NOTE — ASSESSMENT & PLAN NOTE
· Patient reports that he is on Triseba 16 units q h s  with 30 units NovoLog with meals, however states that he only ever uses around 10 to 20 units based on his blood sugar  · Will place patient on Lantus 16 units qhs here (triseba not on formulary) and scheduled humalog 20 units with SSI coverage  · Most recent HgbA1c 5/8 at 8 2%   · QID glucose checks   · Consistent carb diet

## 2018-05-24 NOTE — PHYSICAL THERAPY NOTE
Physical Therapy Evaluation     Patient's Name: Iban Moreno    Admitting Diagnosis  Testicular swelling [N50 89]  Epididymitis [N45 1]  Leukocytosis [D72 829]  Scrotal abscess [N49 2]    Problem List  Patient Active Problem List   Diagnosis    Cellulitis    [facility]    Type 2 diabetes mellitus with hyperglycemia ([place] [address] )    Essential hypertension    Chronic combined systolic and diastolic congestive heart failure ([facility] )    Edema    H/O thyroid cyst    CAD (coronary artery disease)    Hx of CABG    Hyponatremia    Acute cystitis with hematuria    Epididymoorchitis    Lymphedema of both lower extremities       Past Medical History  Past Medical History:   Diagnosis Date    Aortic stenosis     Atrial fibrillation ([facility] )     Cellulitis of hand, left     Cellulitis of hand, right     CHF (congestive heart failure) ([place] [address] )     Coronary artery disease     Diabetes mellitus ([place] [address] )     GI hemorrhage     Gout     Hyperlipidemia     Hypertension     Lung mass     Nonspecific abnormal findings on imaging of lung     Thrombocytopenia (HCC)     Vitamin D deficiency        Past Surgical History  Past Surgical History:   Procedure Laterality Date    CARDIAC SURGERY      CORONARY ARTERY BYPASS GRAFT      FRACTURE SURGERY      HIP SURGERY Left     JOINT REPLACEMENT      KNEE SURGERY Left         05/24/18 1046   Note Type   Note type Eval/Treat   Pain Assessment   Pain Assessment 0-10   Pain Score 6  (increased to 10/10 w/ mobility)   Pain Type Acute pain   Pain Location Scrotum   Pain Frequency Intermittent  (worsens w/ mobility)   Home Living   Type of Home House   Home Layout Two level;Performs ADLs on one level; Able to live on main level with bedroom/bathroom;Stairs to enter with rails  (1st floor setup; 6-7 MARY ALICE w/ HR)   Bathroom Shower/Tub Tub/shower unit   Bathroom Toilet Standard   Bathroom Equipment Grab bars in shower; Shower chair;Grab bars around toilet   Bathroom Accessibility Accessible   Home Equipment Cane  (uses SPC at all times)   Prior Function   Level of [place] Independent with ADLs and functional mobility   Lives With Son  (home during the day)   Receives Help From [facility] [address] in the last 6 months 0   Vocational Retired   Comments pt drives locally   Restrictions/Precautions   [facility] Bearing Precautions Per Order No   Braces or Orthoses (has lift in L shoe/sneaker)   Other Precautions Fall Risk;Pain   General   Family/Caregiver Present No   Cognition   Overall Cognitive Status WFL   Arousal/Participation Alert   Orientation Level Oriented X4   Memory Within functional limits   Following Commands Follows all commands and directions without difficulty   RUE Assessment   RUE Assessment WFL   LUE Assessment   LUE Assessment WFL   RLE Assessment   RLE Assessment WFL   LLE Assessment   LLE Assessment WFL   Coordination   Movements are Fluid and Coordinated 1   Bed Mobility   Rolling R 5  Supervision   Additional items Assist x 1;HOB elevated; Bedrails; Increased time required   Supine to Sit 5  Supervision   Additional items Assist x 1;HOB elevated; Bedrails; Increased time required;Verbal cues   Transfers   Sit to Stand 5  Supervision   Additional items Assist x 1; Increased time required   Stand to Sit 5  Supervision   Additional items Assist x 1; Increased time required   Ambulation/Elevation   Gait pattern Antalgic;Decreased foot clearance;Shuffling;Excessively slow   Gait Assistance 5  Supervision   Additional items Assist x 1;Verbal cues   Assistive Device Straight cane   Distance 75'   Balance   Static Sitting Good   Dynamic Sitting Good   Static Standing Fair +   Dynamic Standing Fair   Ambulatory Fair   Endurance Deficit   Endurance Deficit Yes   Activity Tolerance   Activity Tolerance Patient limited by pain   Medical Staff Made Aware pt w/ up w/ A order   Nurse Made Aware Yes, RN Kelsy Gregg verbalized pt appropriate for PT session, made aware of outcomes   Assessment   Prognosis Good   Problem List Decreased endurance; Impaired balance;Decreased mobility;Pain   Assessment Pt is 81 y o  male seen for PT evaluation on 5/24/2018 s/p admit to [facility] on 5/23/2018 w/ Epididymoorchitis  PT consulted to assess pt's functional mobility and d/c needs  Order placed for PT eval and tx, w/ up w/ A order  Performed at least 2 patient identifiers during session: Name and wristband  Comorbidities affecting pt's physical performance at time of assessment include: DM type 2, CAD s/p CABG, AFib, HTN, CHF, lymphedema of B LEs, chronic peripheral edema who presents w/ R testicular swelling x 2 days  PTA, pt was independent w/ all functional mobility w/ SPC, ambulates community distances and elevations, has 6-7 MARY ALICE, lives w/ son in 2 level home, retired and has a first floor setup  Personal factors affecting pt at time of IE include: ambulating w/ assistive device and stairs to enter home  Please find objective findings from PT assessment regarding body systems outlined above with impairments and limitations including impaired balance, decreased endurance, gait deviations, pain, decreased activity tolerance, decreased functional mobility tolerance and fall risk, as well as mobility assessment (need for supervision level of assist w/ all phases of mobility when usually ambulating independently)  The following objective measures performed on IE also reveal limitations: Barthel Index: 65/100 and Modified [place]: 2 (slight disability)  Pt's clinical presentation is currently unstable/unpredictable seen in pt's presentation of severe pain impacting overall mobility status  Pt to benefit from continued PT tx to address deficits as defined above and maximize level of functional independent mobility and consistency   From PT/mobility standpoint, recommendation at time of d/c would be Home PT vs  anticipate no needs pending progress in order to facilitate return to PLOF  Barriers to Discharge Inaccessible home environment  (6-7 MARY ALICE home)   Goals   Patient Goals "to go home"   [facility] Expiration Date 06/03/18   Short Term Goal #1 In 7-10 days: Increase bilateral LE strength 1/2 grade to facilitate independent mobility, Perform all bed mobility tasks independently to decrease caregiver burden, Perform all transfers modified independent to improve independence, Ambulate > 150 ft  with SPC modified independent w/o LOB and w/ normalized gait pattern 100% of the time, Navigate 7 stairs modified independent with unilateral handrail to facilitate return to previous living environment and Increase all balance 1/2 grade to decrease risk for falls   Treatment Day 0   Plan   Treatment/Interventions Functional transfer training;Elevations; Therapeutic exercise; Endurance training;Gait training;Spoke to nursing   PT Frequency (3-5x/wk)   Recommendation   Recommendation Home PT; Home with family support  (vs  no needs)   Equipment Recommended Cane   PT - OK to Discharge Yes  (when medically cleared)   Modified [place] Scale   Modified [place] Scale 2   Barthel Index   Feeding 10   Bathing 5   Grooming Score 5   Dressing Score 5   Bladder Score 10   Bowels Score 10   Toilet Use Score 10   Transfers (Bed/Chair) Score 10   Mobility (Level Surface) Score 0   Stairs Score 0   Barthel Index Score 65         Jamee Harper, PT

## 2018-05-24 NOTE — CONSULTS
Consultation completed  Please refer to documentation by Dr Thais Botello  May 23, 2018 at 6:12 p m

## 2018-05-24 NOTE — ASSESSMENT & PLAN NOTE
· Pt recently treated for UTI as an outpatient with full 7 day course of ciprofloxacin   · US of scrotum performed with evidence of enlarged and hyperemic right epididymis consistent with epididymitis  Complex collection in head of epididymis measuring 1 2 cm likely representing organizing abscess  No evidence of torsion or orchitis  · Epididymitis likely secondary to recent UTI   · Obtain blood cultures   · Pt did not meet SIRS or sepsis criteria on admission, did have mild leukocytosis of 13, monitor WBCs and temps closely   · Urology consult,  · Evaluated that patient and recommending IV antibiotics (continue IV ceftriaxone), follow up repeat urine culture   No acute urologic intervention needed at this time  · Will need prolonged 2 week course of antibiotics  · Continue to monitor scrotal edema closely

## 2018-05-24 NOTE — ASSESSMENT & PLAN NOTE
· Sodium 133 on admission  · Does have a history of some mild hyponatremia noted on review of previous records  · Monitor with BMP in the AM  · Will hold off on IV fluids at this time as patient with chronic lower extremity edema and EF 40-45%

## 2018-05-24 NOTE — H&P
H&P- Karine Ketn 1935, 81 y o  male MRN: [id number]    Unit/Bed#: -Sophia Encounter: [id number]    Primary Care Provider: Olivia Carrero DO   Date and time admitted to hospital: 5/23/2018  3:39 PM       DOS: 5/23/2018      * Testicular swelling   Assessment & Plan    · Pt recently treated for UTI as an outpatient with full 7 day course of ciprofloxacin   · US of scrotum performed with evidence of enlarged and hyperemic right epididymis consistent with epididymitis  Complex collection in head of epididymis measuring 1 2 cm likely representing organizing abscess  No evidence of torsion or orchitis  · Epididymitis likely secondary to recent UTI   · Obtain blood cultures   · Pt did not meet SIRS or sepsis criteria on admission, did have mild leukocytosis of 13, monitor WBCs and temps closely   · Urology consult,  · Evaluated that patient and recommending IV antibiotics (continue IV ceftriaxone), follow up repeat urine culture   No acute urologic intervention needed at this time  · Will need prolonged 2 week course of antibiotics  · Continue to monitor scrotal edema closely         Hyponatremia   Assessment & Plan    · Sodium 133 on admission  · Does have a history of some mild hyponatremia noted on review of previous records  · Monitor with BMP in the AM  · Will hold off on IV fluids at this time as patient with chronic lower extremity edema and EF 40-45%        Edema   Assessment & Plan    · Chronic bilateral lower extremity edema, appears to be at baseline per patient  · Continue home dose of torsemide   · Monitor volume status with daily weights and I&Os  · Creatinine at baseline        Chronic combined systolic and diastolic congestive heart failure (HCC)   Assessment & Plan    · Does not appear to be in acute exacerbation  · Last ECHO 5/10 with EF of 40-45%  · Has chronic lower extremity edema, encourage leg elevation and stockings        Essential hypertension   Assessment & Plan    · Most recent /70  · Continue toprol XL 25 mg daily and torsemide 40 mg daily   · Monitor         Type 2 diabetes mellitus with hyperglycemia ([facility] [address] )   Assessment & Plan    · Patient reports that he is on Triseba 16 units q h s  with 30 units NovoLog with meals, however states that he only ever uses around 10 to 20 units based on his blood sugar  · Will place patient on Lantus 16 units qhs here (triseba not on formulary) and scheduled humalog 20 units with SSI coverage  · Most recent HgbA1c 5/8 at 8 2%   · QID glucose checks   · Consistent carb diet         Chronic a-fib (HCC)   Assessment & Plan    · Currently rate controlled on toprol XL 25 mg daily   · Anticoagulated with eliquis (on 2 5 mg bid at home, discussed with pharmacy, no contraindication for full dose, will change to 5 mg bid here)   · Pt with some significant bradycardia on admission, will place on telemetry  · Monitor           VTE Prophylaxis: Apixaban (Eliquis)  / sequential compression device   Code Status: Level I - full code, discussed with patient at bedside  POLST: There is no POLST form on file for this patient (pre-hospital)  Discussion with family: Discussed with patient at bedside  Anticipated Length of Stay:  Patient will be admitted on an Inpatient basis with an anticipated length of stay of  > 2 midnights  Justification for Hospital Stay: Testicular swelling with evidence on ultrasound of epididymitis and possible abscess requiring IV antibiotic treatment and urology evaluation  Total Time for Visit, including Counseling / Coordination of Care: 45 minutes  Greater than 50% of this total time spent on direct patient counseling and coordination of care      Chief Complaint:   "I went to the doctor about a week ago for a UTI "    History of Present Illness:    Justin January is a 81 y o  male with significant past medical history of diabetes mellitus, CAD status post CABG, AFib, hypertension, CHF, chronic peripheral edema who presents with right testicular swelling x2 days  Patient reports that last week he went to his PCP for evaluation of UTI  Reports that he was having dysuria and hematuria at that time  Reports that he was placed on Cipro for a course of 7 days total which he completed on Monday  He reports that the dysuria and hematuria resolved, however on Monday when he finished his last dose of Cipro he began to have right testicle discomfort and some mild swelling  Reports that the swelling got worse yesterday and patient could not take the discomfort any longer  He denies any previous episodes of this in the past   Reports that he used to follow up with a urologist but has not in some time now due to his PSA being within normal limits  Patient reports that he also had a penile implant placed by this urologist many years ago? He denies any fevers or chills  Review of Systems:    Review of Systems   Constitutional: Negative for chills, diaphoresis and fever  HENT: Negative for congestion, rhinorrhea, sinus pain, sinus pressure, sneezing, sore throat, tinnitus and trouble swallowing  Eyes: Negative  Respiratory: Negative for cough, chest tightness, shortness of breath and wheezing  Cardiovascular: Positive for leg swelling (chronic)  Negative for chest pain  Gastrointestinal: Negative for abdominal pain, constipation, diarrhea, nausea and vomiting  Genitourinary: Positive for scrotal swelling and testicular pain  Negative for difficulty urinating, dysuria and hematuria  Musculoskeletal: Negative for back pain, joint swelling and myalgias  Skin: Positive for color change (erythema of scrotum)  Negative for pallor and rash  Neurological: Negative for dizziness, syncope, light-headedness and headaches         Past Medical and Surgical History:     Past Medical History:   Diagnosis Date    Aortic stenosis     Atrial fibrillation (HCC)     Cellulitis of hand, left     Cellulitis of hand, right     CHF (congestive heart failure) ([facility] [address] )     Coronary artery disease     Diabetes mellitus ([facility] [address] )     GI hemorrhage     Gout     Hyperlipidemia     Hypertension     Lung mass     Nonspecific abnormal findings on imaging of lung     Thrombocytopenia (HCC)     Vitamin D deficiency        Past Surgical History:   Procedure Laterality Date    CARDIAC SURGERY      CORONARY ARTERY BYPASS GRAFT      FRACTURE SURGERY      HIP SURGERY Left     JOINT REPLACEMENT      KNEE SURGERY Left        Meds/Allergies:    Prior to Admission medications    Medication Sig Start Date End Date Taking? Authorizing Provider   apixaban (ELIQUIS) 2 5 mg Take 1 tablet (2 5 mg total) by mouth 2 (two) times a day 5/21/18 Fae Beards, DO   aspirin 81 MG tablet Take 1 tablet by mouth daily 5/27/15   Historical Provider, MD   ciprofloxacin (CIPRO) 500 mg tablet Take 1 tablet (500 mg total) by mouth every 12 (twelve) hours for 7 days 5/22/18 5/29/18 Fae Beards, DO   colchicine (COLCRYS) 0 6 mg tablet Take 1 tablet (0 6 mg total) by mouth daily 5/21/18 Fae Beards, DO   docusate sodium (COLACE) 100 mg capsule Take 1 capsule by mouth every 12 (twelve) hours for 30 days 5/16/17 6/15/17  SONJA Castellano   insulin aspart (NovoLOG) 100 units/mL injection Inject 30 Units under the skin 3 (three) times a day before meals      Historical Provider, MD   insulin degludec (TRESIBA) injection pen 100 units/mL Inject under the skin daily 16 UNIT DAILY AT BEDTIME    Historical Provider, MD   Insulin Syringe-Needle U-100 (ULTICARE INSULIN SYRINGE) 31G X 5/16" 1 ML MISC by Does not apply route 8/6/13   Historical Provider, MD   isosorbide mononitrate (IMDUR) 30 mg 24 hr tablet Take 1 tablet (30 mg total) by mouth daily 5/21/18 Fae Beards, DO   KLOR-CON M20 20 MEQ tablet TAKE 1 TABLET BY MOUTH EVERY 12 HOURS 4/23/18 Fae Beards, DO   metoprolol succinate (TOPROL-XL) 25 mg 24 hr tablet Take 1 tablet (25 mg total) by mouth daily 5/21/18 Lisandro Ross DO   torsemide [facility]) 20 mg tablet Take 2 tablets (40 mg total) by mouth daily  5/28/16   Marco Antonio Streeter PA-C   allopurinol (ZYLOPRIM) 300 mg tablet TAKE 1 TABLET BY MOUTH EVERY DAY 3/20/18 5/23/18  Lisandro Ross DO   insulin glargine (LANTUS) 100 units/mL subcutaneous injection Inject 50 Units under the skin daily at bedtime  5/23/18  Historical Provider, MD   isosorbide mononitrate (ISMO,MONOKET) 10 MG tablet Take 30 mg by mouth daily  5/23/18  Historical Provider, MD     I have reviewed home medications using allscripts  Allergies: Allergies   Allergen Reactions    Flovent [Fluticasone]     Medical Tape     Other        Social History:     Marital Status:    Occupation:   Patient Pre-hospital Living Situation: Pt lives at home with his son  Patient Pre-hospital Level of Mobility: Cane at baseline  Patient Pre-hospital Diet Restrictions: Diabetic   Substance Use History:   History   Alcohol Use No     Comment: (history)     History   Smoking Status    Former Smoker   Smokeless Tobacco    Never Used     History   Drug Use No       Family History:    non-contributory    Physical Exam:     Vitals:   Blood Pressure: 154/70 (05/23/18 2006)  Pulse: 68 (05/23/18 2006)  Temperature: 98 °F (36 7 °C) (05/23/18 2006)  Temp Source: Oral (05/23/18 2006)  Respirations: 18 (05/23/18 2006)  Height: 5' 10" (177 8 cm) (05/23/18 2006)  Weight - Scale: 110 kg (242 lb 8 1 oz) (05/23/18 2006)  SpO2: 97 % (05/23/18 2006)    Physical Exam   Constitutional: No distress  Pt is in no acute distress lying in his hospital bed resting comfortably  HENT:   Head: Normocephalic and atraumatic  Eyes: Conjunctivae are normal  No scleral icterus  Cardiovascular: Regular rhythm and intact distal pulses  Bradycardic on exam    Pulmonary/Chest: Effort normal and breath sounds normal  No respiratory distress  He has no wheezes  He has no rales  Abdominal: Soft   Bowel sounds are normal  He exhibits no distension  There is no tenderness  There is no rebound  No suprapubic tenderness noted   Musculoskeletal: He exhibits edema (1+ pitting edema lower extremities bilaterally with some chronic hyperpigmentation noted)  Neurological: He is alert  Skin: Skin is warm and dry  He is not diaphoretic  Psychiatric: He has a normal mood and affect  Vitals reviewed  Additional Data:     Lab Results: I have personally reviewed pertinent reports  Results from last 7 days  Lab Units 05/23/18  1613   WBC Thousand/uL 13 16*   HEMOGLOBIN g/dL 12 9   HEMATOCRIT % 39 7   PLATELETS Thousands/uL 168   NEUTROS PCT % 80*   LYMPHS PCT % 10*   MONOS PCT % 8   EOS PCT % 1       Results from last 7 days  Lab Units 05/23/18  1613   SODIUM mmol/L 133*   POTASSIUM mmol/L 4 3   CHLORIDE mmol/L 100   CO2 mmol/L 25   BUN mg/dL 55*   CREATININE mg/dL 1 26   CALCIUM mg/dL 8 7   GLUCOSE RANDOM mg/dL 153*                   Imaging: I have personally reviewed pertinent reports  US scrotum and testicles   Final Result by Arron Badillo MD (05/23 1712)          1  Markedly enlarged and hyperemic right epididymis consistent with epididymitis  Complex collection in the head of the epididymis measuring 1 2 cm likely to represent an organizing abscess  No evidence of testicular torsion or orchitis  2   Small right hydrocele, likely reactive  3   Marked inflammation or edema in the right aspect of the scrotum  The study was marked in [facility] for immediate notification  Workstation performed: [id number]             EKG, Pathology, and Other Studies Reviewed on Admission:   · US results reviewed    Allscripts / Epic Records Reviewed: Yes     ** Please Note: This note has been constructed using a voice recognition system   **

## 2018-05-24 NOTE — PLAN OF CARE
Problem: DISCHARGE PLANNING  Goal: Discharge to home or other facility with appropriate resources  INTERVENTIONS:  - Identify barriers to discharge w/patient and caregiver  - Arrange for needed discharge resources and transportation as appropriate  - Identify discharge learning needs (meds, wound care, etc )  - Arrange for interpretive services to assist at discharge as needed  - Refer to Case Management Department for coordinating discharge planning if the patient needs post-hospital services based on physician/advanced practitioner order or complex needs related to functional status, cognitive ability, or social support system   Outcome: Progressing  LOS- 1 DAY  PATIENT IS NOT A BUNDLE  PATIENT IS NOT A READMIT  CM met with patient at bedside  Patient reports he lives in a 2 story house with his son in [facility]  Patient uses a cane and completes ADLS by himself  Patient reports reports having [facility] a year ago and also going to [facility] for rehab  Patient uses the [address] and has prescription coverage  Patient states his son is his POA and CM asked for a second copy to put on file  Patient receives SSI and relies on his son for transportation  CM also spoke to patient about the recommendation for home PT  Per patient he needs to think about it first      CM dept will continue to follow patient until d/c

## 2018-05-24 NOTE — ASSESSMENT & PLAN NOTE
· Chronic bilateral lower extremity edema, appears to be at baseline per patient  · Continue home dose of torsemide   · Monitor volume status with daily weights and I&Os  · Creatinine at baseline

## 2018-05-24 NOTE — ASSESSMENT & PLAN NOTE
· Currently rate controlled on toprol XL 25 mg daily   · Anticoagulated with eliquis (on 2 5 mg bid at home, discussed with pharmacy, no contraindication for full dose, will change to 5 mg bid here)   · Pt with some significant bradycardia on admission, will place on telemetry  · Monitor

## 2018-05-24 NOTE — PLAN OF CARE
DISCHARGE PLANNING     Discharge to home or other facility with appropriate resources Progressing        GENITOURINARY - ADULT     Maintains or returns to baseline urinary function Progressing        INFECTION - ADULT     Absence or prevention of progression during hospitalization Progressing        Knowledge Deficit     Patient/family/caregiver demonstrates understanding of disease process, treatment plan, medications, and discharge instructions Progressing        MUSCULOSKELETAL - ADULT     Maintain or return mobility to safest level of function Progressing        PAIN - ADULT     Verbalizes/displays adequate comfort level or baseline comfort level Progressing        Potential for Falls     Patient will remain free of falls Progressing        SAFETY ADULT     Patient will remain free of falls Progressing     Maintain or return to baseline ADL function Progressing     Maintain or return mobility status to optimal level Progressing        SKIN/TISSUE INTEGRITY - ADULT     Skin integrity remains intact Progressing

## 2018-05-24 NOTE — CASE MANAGEMENT
Initial Clinical Review    Admission: Date/Time/Statement: 5/23/18 @ 1739     Orders Placed This Encounter   Procedures    Inpatient Admission (expected length of stay for this patient is greater than two midnights)     Standing Status:   Standing     Number of Occurrences:   1     Order Specific Question:   Admitting Physician     Answer:   Ana M Best [[id number]]     Order Specific Question:   Level of Care     Answer:   Med Surg [16]     Order Specific Question:   Estimated length of stay     Answer:   More than 2 Midnights     Order Specific Question:   Certification     Answer:   I certify that inpatient services are medically necessary for this patient for a duration of greater than two midnights  See H&P and MD Progress Notes for additional information about the patient's course of treatment  ED: Date/Time/Mode of Arrival:   ED Arrival Information     Expected Arrival Acuity Means of Arrival Escorted By Service Admission Type    5/23/2018 14:48 5/23/2018 15:04 Urgent Walk-In Family Member General Medicine Urgent    Arrival Complaint    TESTICULAR MASS          Chief Complaint:   Chief Complaint   Patient presents with    Testicle Swelling     Pt c/o right testicle swelling starting Monday, pt reports administering antibiotics for bladder infection, worried it may be connected  Denies any injury/trauma       History of Illness: Sabi Carr is a 81 y o  male with significant past medical history of diabetes mellitus, CAD status post CABG, AFib, hypertension, CHF, chronic peripheral edema who presents with right testicular swelling x2 days  Patient reports that last week he went to his PCP for evaluation of UTI  Reports that he was having dysuria and hematuria at that time  Reports that he was placed on Cipro for a course of 7 days total which he completed on Monday    He reports that the dysuria and hematuria resolved, however on Monday when he finished his last dose of Cipro he began to have right testicle discomfort and some mild swelling  Reports that the swelling got worse yesterday and patient could not take the discomfort any longer  He denies any previous episodes of this in the past   Reports that he used to follow up with a urologist but has not in some time now due to his PSA being within normal limits  Patient reports that he also had a penile implant placed by this urologist many years ago? He denies any fevers or chills        ED Vital Signs:   ED Triage Vitals   Temperature Pulse Respirations Blood Pressure SpO2   05/23/18 1513 05/23/18 1513 05/23/18 1513 05/23/18 1513 05/23/18 1513   98 2 °F (36 8 °C) (!) 42 19 153/63 98 %      Temp Source Heart Rate Source Patient Position - Orthostatic VS BP Location FiO2 (%)   05/23/18 1513 05/23/18 1513 05/23/18 1513 05/23/18 1513 --   Oral Monitor Sitting Left arm       Pain Score       05/23/18 1836       7        Wt Readings from Last 1 Encounters:   05/24/18 110 kg (243 lb 2 7 oz)       Vital Signs (abnormal):   05/23/18 1516 --  53 -- -- -- -- [facility]   05/23/18 1513 98 2 °F (36 8 °C)  42 19 153/63 98 % 105       Abnormal Labs/Diagnostic Test Results: na 133, BUN creat 55  1 26, wbc  13 16  US scrotum-        Markedly enlarged and hyperemic right epididymis consistent with epididymitis   Complex collection in the head of the epididymis measuring 1 2 cm likely to represent an organizing abscess   No evidence of testicular torsion or orchitis  2   Small right hydrocele, likely reactive  3   Marked inflammation or edema in the right aspect of the scrotum            ED Treatment:   Medication Administration from 05/23/2018 1448 to 05/23/2018 1950       Date/Time Order Dose Route Action Action by Comments     05/23/2018 1850 cefTRIAXone (ROCEPHIN) IVPB (premix) 1,000 mg 0 mg Intravenous Stopped Sigifredo Land RN      05/23/2018 1745 cefTRIAXone (ROCEPHIN) IVPB (premix) 1,000 mg 1,000 mg Intravenous [place]aci [place]      05/23/2018 1836 morphine injection 2 mg 2 mg Intravenous Given Omar Campos RN           Past Medical/Surgical History: Active Ambulatory Problems     Diagnosis Date Noted    Cellulitis 05/23/2016    Chronic a-fib ([facility] ) 05/23/2016    Type 2 diabetes mellitus with hyperglycemia ([facility] ) 05/23/2016    Essential hypertension 05/23/2016    Chronic combined systolic and diastolic congestive heart failure ([facility] ) 05/23/2016    Edema 05/23/2016    H/O thyroid cyst 05/23/2016    CAD (coronary artery disease) 05/23/2016    Hx of CABG 05/23/2016    Hyponatremia 05/23/2016    Acute cystitis with hematuria 05/15/2018     Resolved Ambulatory Problems     Diagnosis Date Noted    No Resolved Ambulatory Problems     Past Medical History:   Diagnosis Date    Aortic stenosis     Atrial fibrillation (HCC)     Cellulitis of hand, left     Cellulitis of hand, right     CHF (congestive heart failure) ([facility] )     Coronary artery disease     Diabetes mellitus ([facility] )     GI hemorrhage     Gout     Hyperlipidemia     Hypertension     Lung mass     Nonspecific abnormal findings on imaging of lung     Thrombocytopenia (HCC)     Vitamin D deficiency        Admitting Diagnosis: Testicular swelling [N50 89]  Epididymitis [N45 1]  Leukocytosis [D72 829]  Scrotal abscess [N49 2]    Age/Sex: 81 y o  male    Assessment/Plan:   * Testicular swelling   Assessment & Plan     · Pt recently treated for UTI as an outpatient with full 7 day course of ciprofloxacin   · US of scrotum performed with evidence of enlarged and hyperemic right epididymis consistent with epididymitis  Complex collection in head of epididymis measuring 1 2 cm likely representing organizing abscess  No evidence of torsion or orchitis  · Epididymitis likely secondary to recent UTI   · Obtain blood cultures   · Pt did not meet SIRS or sepsis criteria on admission, did have mild leukocytosis of 13, monitor WBCs and temps closely   · Urology consult,  ?  Evaluated that patient and recommending IV antibiotics (continue IV ceftriaxone), follow up repeat urine culture  No acute urologic intervention needed at this time  ? Will need prolonged 2 week course of antibiotics  ?  Continue to monitor scrotal edema closely           Hyponatremia   Assessment & Plan     · Sodium 133 on admission  · Does have a history of some mild hyponatremia noted on review of previous records  · Monitor with BMP in the AM  · Will hold off on IV fluids at this time as patient with chronic lower extremity edema and EF 40-45%          Edema   Assessment & Plan     · Chronic bilateral lower extremity edema, appears to be at baseline per patient  · Continue home dose of torsemide   · Monitor volume status with daily weights and I&Os  · Creatinine at baseline          Chronic combined systolic and diastolic congestive heart failure (HCC)   Assessment & Plan     · Does not appear to be in acute exacerbation  · Last ECHO 5/10 with EF of 40-45%  · Has chronic lower extremity edema, encourage leg elevation and stockings          Essential hypertension   Assessment & Plan     · Most recent /70  · Continue toprol XL 25 mg daily and torsemide 40 mg daily   · Monitor           Type 2 diabetes mellitus with hyperglycemia (HCC)   Assessment & Plan     · Patient reports that he is on Triseba 16 units q h s  with 30 units NovoLog with meals, however states that he only ever uses around 10 to 20 units based on his blood sugar  · Will place patient on Lantus 16 units qhs here (triseba not on formulary) and scheduled humalog 20 units with SSI coverage  · Most recent HgbA1c 5/8 at 8 2%   · QID glucose checks   · Consistent carb diet           Chronic a-fib (HCC)   Assessment & Plan     · Currently rate controlled on toprol XL 25 mg daily   · Anticoagulated with eliquis (on 2 5 mg bid at home, discussed with pharmacy, no contraindication for full dose, will change to 5 mg bid here)   · Pt with some significant bradycardia on admission, will place on telemetry  · Monitor              VTE Prophylaxis: Apixaban (Eliquis)  / sequential compression device   Code Status: Level I - full code, discussed with patient at bedside  POLST: There is no POLST form on file for this patient (pre-hospital)  Discussion with family: Discussed with patient at bedside       Anticipated Length of Stay:  Patient will be admitted on an Inpatient basis with an anticipated length of stay of  > 2 midnights  Justification for Hospital Stay: Testicular swelling with evidence on ultrasound of epididymitis and possible abscess requiring IV antibiotic treatment and urology evaluation         Admission Orders:  Scheduled Meds:   Current Facility-Administered Medications:  acetaminophen 650 mg Oral Q6H PRN KOTA Greenwood-C   allopurinol 300 mg Oral Daily Minerva B Satanta, PA-C   apixaban 5 mg Oral BID Minerva B Satanta, PA-C   aspirin 81 mg Oral Daily Minerva B Satanta, PA-C   cefTRIAXone 2,000 mg Intravenous Q24H Monika Berry MD   docusate sodium 100 mg Oral Q12H Minerva B Becca, PA-C   insulin glargine 16 Units Subcutaneous HS Minerva B Satanta, PA-C   insulin lispro 1-5 Units Subcutaneous TID AC Minerva B Satanta, PA-C   insulin lispro 1-5 Units Subcutaneous HS Minerva B Satanta, PA-C   isosorbide mononitrate 30 mg Oral Daily Minerva B Satanta, PA-C   metoprolol succinate 25 mg Oral Daily Minerva B Satanta, PA-C   potassium chloride 20 mEq Oral Q12H Minerva B Satanta, PA-C   torsemide 40 mg Oral Daily Minerva B Satanta, PA-C   traMADol 50 mg Oral Q6H PRN Petey Stevens MD     Continuous Infusions:    PRN Meds:   acetaminophen    traMADol     Fingerstick ac and hs   Cons carb diet   I&O   Daily weight   Tele   OT PT eval   Up w/ assist   5/24  cmp, cbc   Na   135, BUN creat   51  1 25, wbc  11 15, gluc  150    Urology consult 5/24  Assessment:  Mr Estela Nuno is a very pleasant 23-year-old male presenting to [facility] [place] with chief complaint of right testicular pain not accompanied by fever, chills, flank pain, hematuria or dysuria  Scrotal ultrasound was positive for findings consistent with epididymitis and small 1 2 cm abscess  Today patient expresses improvement with pain and swelling  Right scrotum on examination is warm to touch, mildly edematous but firm and erythemic  However, scrotal sac is intact without laceration, abrasion or seepage of serous fluid or purulence  Urine cultures moderately positive for E coli  Blood cultures are pending  Patient is receiving ceftriaxone per primary team; appreciated      Plan:  Continue current regimen  Follow through with final culture result; narrow per sensitivities  Continue symptom management  Elevate scrotum& apply cold or warm contingent on patient's preference and response  Abscess is  too small for percutaneous drainage  Monitor scrotal exam for any changes      Urology consult 5/23   ASSESSMENT:    81 y o  old male with  right epididymo-orchitis after UTI        PLAN:      I had a lengthy discussion with the patient  We discussed that this is likely secondary to the same E coli infection of his bladder  We discussed that he will need a prolonged 2 week course of antibiotics with close follow up with Urology as an outpatient  He will be admitted for IV antibiotics for now  Cephalosporin should be fine    The patient can then be discharged on a long course of Keflex as long as his exam is improving

## 2018-05-24 NOTE — PROGRESS NOTES
Progress Note - Karine Kent 81 y o  male MRN: [id number]    Unit/Bed#: -Sophia Encounter: [id number]      Assessment:  Mr Karoline Bell is a very pleasant 16-year-old male presenting to [facility] with chief complaint of right testicular pain not accompanied by fever, chills, flank pain, hematuria or dysuria  Scrotal ultrasound was positive for findings consistent with epididymitis and small 1 2 cm abscess  Today patient expresses improvement with pain and swelling  Right scrotum on examination is warm to touch, mildly edematous but firm and erythemic  However, scrotal sac is intact without laceration, abrasion or seepage of serous fluid or purulence  Urine cultures moderately positive for E coli  Blood cultures are pending  Patient is receiving ceftriaxone per primary team; appreciated  Plan:  Continue current regimen  Follow through with final culture result; narrow per sensitivities  Continue symptom management  Elevate scrotum& apply cold or warm contingent on patient's preference and response  Abscess is  too small for percutaneous drainage  Monitor scrotal exam for any changes  Subjective:   I feel so much better    Objective:     Vitals: Blood pressure 151/71, pulse 67, temperature 98 1 °F (36 7 °C), temperature source Oral, resp  rate 18, height 5' 10" (1 778 m), weight 110 kg (243 lb 2 7 oz), SpO2 98 %  ,Body mass index is 34 89 kg/m²        Intake/Output Summary (Last 24 hours) at 05/24/18 0830  Last data filed at 05/24/18 4412   Gross per 24 hour   Intake              170 ml   Output              500 ml   Net             -330 ml       Physical Exam: General appearance: alert and oriented, in no acute distress, appears stated age and cooperative  Head: Normocephalic, without obvious abnormality, atraumatic  Neck: no adenopathy, no carotid bruit, no JVD, supple, symmetrical, trachea midline and thyroid not enlarged, symmetric, no tenderness/mass/nodules  Lungs: clear to auscultation bilaterally  Heart: regular rate and rhythm, S1, S2 normal, no murmur, click, rub or gallop  Abdomen: soft, non-tender; bowel sounds normal; no masses,  no organomegaly  Extremities: extremities normal, warm and well-perfused; no cyanosis, clubbing, or edema  Pulses: 2+ and symmetric  Neurologic: Grossly normal  Genitalia--uncircumcised male with fully retractable foreskin  Normal penile shaft normal glans penis, midline urethra  Bilaterally descended testicles  Normal on the left  Right scrotum edematous, erythemic and warm to touch  Firm on palpation  No open lesions, abrasions or drainage from urethra or scrotal sac  No evidence of crepitus or necrosis  Invasive Devices     Peripheral Intravenous Line            Peripheral IV 05/23/18 Right Antecubital less than 1 day              Lab Results   Component Value Date    WBC 11 15 (H) 05/24/2018    HGB 13 3 05/24/2018    HCT 40 6 05/24/2018    MCV 90 05/24/2018     05/24/2018     Lab Results   Component Value Date    GLUCOSE 139 05/24/2018    CALCIUM 8 8 05/24/2018     (L) 05/24/2018    K 4 2 05/24/2018    CO2 25 05/24/2018     05/24/2018    BUN 51 (H) 05/24/2018    CREATININE 1 25 05/24/2018       Lab, Imaging and other studies: I have personally reviewed pertinent reports

## 2018-05-25 ENCOUNTER — TELEPHONE (OUTPATIENT)
Dept: OTHER | Facility: HOSPITAL | Age: 83
End: 2018-05-25

## 2018-05-25 LAB
ALBUMIN SERPL BCP-MCNC: 2.1 G/DL (ref 3.5–5)
ALP SERPL-CCNC: 388 U/L (ref 46–116)
ALT SERPL W P-5'-P-CCNC: 18 U/L (ref 12–78)
ANION GAP SERPL CALCULATED.3IONS-SCNC: 10 MMOL/L (ref 4–13)
AST SERPL W P-5'-P-CCNC: 26 U/L (ref 5–45)
BILIRUB SERPL-MCNC: 1.4 MG/DL (ref 0.2–1)
BUN SERPL-MCNC: 46 MG/DL (ref 5–25)
CALCIUM SERPL-MCNC: 8.8 MG/DL (ref 8.3–10.1)
CHLORIDE SERPL-SCNC: 102 MMOL/L (ref 100–108)
CO2 SERPL-SCNC: 24 MMOL/L (ref 21–32)
CREAT SERPL-MCNC: 1.06 MG/DL (ref 0.6–1.3)
ERYTHROCYTE [DISTWIDTH] IN BLOOD BY AUTOMATED COUNT: 16.8 % (ref 11.6–15.1)
GFR SERPL CREATININE-BSD FRML MDRD: 65 ML/MIN/1.73SQ M
GLUCOSE SERPL-MCNC: 139 MG/DL (ref 65–140)
GLUCOSE SERPL-MCNC: 140 MG/DL (ref 65–140)
GLUCOSE SERPL-MCNC: 142 MG/DL (ref 65–140)
GLUCOSE SERPL-MCNC: 172 MG/DL (ref 65–140)
GLUCOSE SERPL-MCNC: 178 MG/DL (ref 65–140)
GLUCOSE SERPL-MCNC: 190 MG/DL (ref 65–140)
HCT VFR BLD AUTO: 39.6 % (ref 36.5–49.3)
HGB BLD-MCNC: 13 G/DL (ref 12–17)
MCH RBC QN AUTO: 29.6 PG (ref 26.8–34.3)
MCHC RBC AUTO-ENTMCNC: 32.8 G/DL (ref 31.4–37.4)
MCV RBC AUTO: 90 FL (ref 82–98)
PLATELET # BLD AUTO: 179 THOUSANDS/UL (ref 149–390)
PMV BLD AUTO: 12.9 FL (ref 8.9–12.7)
POTASSIUM SERPL-SCNC: 3.9 MMOL/L (ref 3.5–5.3)
PROT SERPL-MCNC: 6.6 G/DL (ref 6.4–8.2)
RBC # BLD AUTO: 4.39 MILLION/UL (ref 3.88–5.62)
SODIUM SERPL-SCNC: 136 MMOL/L (ref 136–145)
WBC # BLD AUTO: 9.37 THOUSAND/UL (ref 4.31–10.16)

## 2018-05-25 PROCEDURE — 80053 COMPREHEN METABOLIC PANEL: CPT | Performed by: INTERNAL MEDICINE

## 2018-05-25 PROCEDURE — 99232 SBSQ HOSP IP/OBS MODERATE 35: CPT | Performed by: UROLOGY

## 2018-05-25 PROCEDURE — 82948 REAGENT STRIP/BLOOD GLUCOSE: CPT

## 2018-05-25 PROCEDURE — 85027 COMPLETE CBC AUTOMATED: CPT | Performed by: INTERNAL MEDICINE

## 2018-05-25 PROCEDURE — 99223 1ST HOSP IP/OBS HIGH 75: CPT | Performed by: INTERNAL MEDICINE

## 2018-05-25 PROCEDURE — 99233 SBSQ HOSP IP/OBS HIGH 50: CPT | Performed by: INTERNAL MEDICINE

## 2018-05-25 RX ADMIN — DOCUSATE SODIUM 100 MG: 100 CAPSULE, LIQUID FILLED ORAL at 20:06

## 2018-05-25 RX ADMIN — TORSEMIDE 40 MG: 20 TABLET ORAL at 09:38

## 2018-05-25 RX ADMIN — ASPIRIN 81 MG 81 MG: 81 TABLET ORAL at 09:38

## 2018-05-25 RX ADMIN — METOPROLOL SUCCINATE 25 MG: 25 TABLET, EXTENDED RELEASE ORAL at 09:38

## 2018-05-25 RX ADMIN — ACETAMINOPHEN 650 MG: 325 TABLET ORAL at 20:15

## 2018-05-25 RX ADMIN — DOCUSATE SODIUM 100 MG: 100 CAPSULE, LIQUID FILLED ORAL at 09:38

## 2018-05-25 RX ADMIN — APIXABAN 5 MG: 5 TABLET, FILM COATED ORAL at 18:35

## 2018-05-25 RX ADMIN — CEFAZOLIN SODIUM 1000 MG: 1 SOLUTION INTRAVENOUS at 21:56

## 2018-05-25 RX ADMIN — INSULIN LISPRO 1 UNITS: 100 INJECTION, SOLUTION INTRAVENOUS; SUBCUTANEOUS at 15:34

## 2018-05-25 RX ADMIN — ISOSORBIDE MONONITRATE 30 MG: 30 TABLET, EXTENDED RELEASE ORAL at 09:38

## 2018-05-25 RX ADMIN — INSULIN GLARGINE 16 UNITS: 100 INJECTION, SOLUTION SUBCUTANEOUS at 21:51

## 2018-05-25 RX ADMIN — CEFAZOLIN SODIUM 1000 MG: 1 SOLUTION INTRAVENOUS at 15:29

## 2018-05-25 RX ADMIN — ALLOPURINOL 300 MG: 300 TABLET ORAL at 09:38

## 2018-05-25 RX ADMIN — INSULIN LISPRO 1 UNITS: 100 INJECTION, SOLUTION INTRAVENOUS; SUBCUTANEOUS at 21:51

## 2018-05-25 RX ADMIN — APIXABAN 5 MG: 5 TABLET, FILM COATED ORAL at 09:38

## 2018-05-25 RX ADMIN — POTASSIUM CHLORIDE 20 MEQ: 1500 TABLET, EXTENDED RELEASE ORAL at 09:39

## 2018-05-25 RX ADMIN — POTASSIUM CHLORIDE 20 MEQ: 1500 TABLET, EXTENDED RELEASE ORAL at 20:06

## 2018-05-25 NOTE — PHYSICIAN ADVISOR
Current patient class: Inpatient  The patient is currently on Hospital Day: 2      The patient was admitted to the hospital at [phone] on 5/23/18 for the following diagnosis:  Testicular swelling [N50 89]  Epididymitis [N45 1]  Leukocytosis [D72 829]  Scrotal abscess [N49 2]       There is documentation in the medical record of an expected length of stay of at least 2 midnights  The patient is therefore expected to satisfy the 2 midnight benchmark and given the 2 midnight presumption is appropriate for INPATIENT ADMISSION  Given this expectation of a satisfying stay, CMS instructs us that the patient is most often appropriate for inpatient admission under part A provided medical necessity is documented in the chart  After review of the relevant documentation, labs, vital signs and test results, the patient is appropriate for INPATIENT ADMISSION  Admission to the hospital as an inpatient is a complex decision making process which requires the practitioner to consider the patients presenting complaint, history and physical examination and all relevant testing  With this in mind, in this case, the patient was deemed appropriate for INPATIENT ADMISSION  After review of the documentation and testing available at the time of the admission I concur with this clinical determination of medical necessity  Rationale is as follows: The patient is a 81 yrs old Male who presented to the ED at 5/23/2018  3:39 PM with a chief complaint of Testicle Swelling (Pt c/o right testicle swelling starting Monday, pt reports administering antibiotics for bladder infection, worried it may be connected  Denies any injury/trauma)     Given the need for further hospitalization, and along with the documentation of medical necessity present in the chart, the patient is appropriate for inpatient admission  The patient is expected to satisfy the 2 midnight benchmark, and will require further acute medical care   The patient does have comorbid conditions which increases the risk for significant adverse outcome  Given this the patient is appropriate for inpatient admission        The patients vitals on arrival were ED Triage Vitals   Temperature Pulse Respirations Blood Pressure SpO2   05/23/18 1513 05/23/18 1513 05/23/18 1513 05/23/18 1513 05/23/18 1513   98 2 °F (36 8 °C) (!) 42 19 153/63 98 %      Temp Source Heart Rate Source Patient Position - Orthostatic VS BP Location FiO2 (%)   05/23/18 1513 05/23/18 1513 05/23/18 1513 05/23/18 1513 --   Oral Monitor Sitting Left arm       Pain Score       05/23/18 1836       7           Past Medical History:   Diagnosis Date    Aortic stenosis     Atrial fibrillation (HCC)     Cellulitis of hand, left     Cellulitis of hand, right     CHF (congestive heart failure) (HCC)     Coronary artery disease     Diabetes mellitus ([facility] )     GI hemorrhage     Gout     Hyperlipidemia     Hypertension     Lung mass     Nonspecific abnormal findings on imaging of lung     Thrombocytopenia (HCC)     Vitamin D deficiency      Past Surgical History:   Procedure Laterality Date    CARDIAC SURGERY      CORONARY ARTERY BYPASS GRAFT      FRACTURE SURGERY      HIP SURGERY Left     JOINT REPLACEMENT      KNEE SURGERY Left            Consults have been placed to:   IP CONSULT TO UROLOGY    Vitals:    05/24/18 0733 05/24/18 0827 05/24/18 1100 05/24/18 1500   BP: 139/65 151/71 126/59 148/70   BP Location: Left arm  Left arm    Pulse: (!) 52 67 (!) 54 (!) 52   Resp: 18  18 16   Temp: 98 1 °F (36 7 °C)  98 2 °F (36 8 °C) 97 5 °F (36 4 °C)   TempSrc: Oral  Oral Oral   SpO2: 98%  93% 97%   Weight:       Height:           Most recent labs:    Recent Labs      05/24/18   0448   WBC  11 15*   HGB  13 3   HCT  40 6   PLT  185   K  4 2   NA  135*   CALCIUM  8 8   BUN  51*   CREATININE  1 25       Scheduled Meds:  Current Facility-Administered Medications:  acetaminophen 650 mg Oral Q6H PRN Mike Alvarez PA-C allopurinol 300 mg Oral Daily Minerva Hudson PA-C    apixaban 5 mg Oral BID Sb Hernandez PA-C    aspirin 81 mg Oral Daily Minerva Hudson PA-C    cefTRIAXone 2,000 mg Intravenous Q24H Julia Sheriff MD Last Rate: 2,000 mg (05/24/18 3650)   docusate sodium 100 mg Oral Q12H Minerva Hudson PA-C    insulin glargine 16 Units Subcutaneous HS Minerva Hudson PA-C    insulin lispro 1-5 Units Subcutaneous TID AC Minerva Hudson PA-C    insulin lispro 1-5 Units Subcutaneous HS Minerva Hudson PA-C    isosorbide mononitrate 30 mg Oral Daily Minerva Hudson PA-C    metoprolol succinate 25 mg Oral Daily Minerva Hudson PA-C    potassium chloride 20 mEq Oral Q12H Minerva Hudson PA-C    torsemide 40 mg Oral Daily Minerva Hudson PA-C    traMADol 50 mg Oral Q6H PRN Petey Stevens MD      Continuous Infusions:   PRN Meds:   acetaminophen    traMADol    Surgical procedures (if appropriate):

## 2018-05-25 NOTE — CONSULTS
Consultation - Infectious Disease   Magen Torrezr 81 y o  male MRN: [id number]  Unit/Bed#: -01 Encounter: [id number]      IMPRESSION & RECOMMENDATIONS:   Impression/Recommendations: This is a 81 y o  male, with multiple medical problems, admitted 2 days ago with right-sided epididymitis following a recent UTI  Ultrasound with complex collection at the head of the right epididymis the patient is clinically improved on IV antibiotic  1   Right-sided epididymitis with scrotal cellulitis  Microbiology of this is similar to UTI  Patient had a recent urine culture as an outpatient with pansensitive E coli  This is the most likely etiology of this infection also  Patient is clinically much improved on IV ceftriaxone  At this point, antibiotic can be de-escalated to IV cefazolin  Antibiotic can be transitioned to p o  Keflex in the next 1-2 days, if patient continues to improve  Change antibiotic to IV cefazolin  Serial exams  Monitor temperature/WBC  Anticipate transition to p o  Keflex in 1-2 days, if patient continued to improve clinically  2   Possible testicular/epididymal abscess  Ultrasound finding is concerning for abscess  Patient is clinically improved  Therefore, Urology has no planned surgical drainage  However, patient will still need repeat imaging as an outpatient  He may need to stay on antibiotic for prolonged period of time, until this collection/abscess resolves clinically and radiologically  Antibiotic plan as in above  Urology follow-up after discharge  Recommend repeat ultrasound as an outpatient  Patient will need to stay on antibiotics until collection/abscess resolves  3   DM, with hyperglycemia on admission  This contributes to development of infection above  Patient counseled regarding the need to better control blood sugar at home  Management per primary service  4   Mild JAHAIRA on admission  Creatinine is improved, probably at baseline    Antibiotic at full dose  Monitor creatinine  Discussed with patient in detail regarding above plan  Patient will need urology follow-up after discharge  Thank you for this consultation  We will follow along with you  HISTORY OF PRESENT ILLNESS:  Reason for Consult:  Epididymitis    HPI: Estephania Ho is a 81 y o  male, with multiple medical problems, saw his PCP last week with dysuria and mild hematuria  Urine culture was done and patient was placed on ciprofloxacin for 7 day course for UTI  Patient's urinary symptoms did resolve with ciprofloxacin  However, after he finishes/ciprofloxacin dose 4 days ago, he started noticing swelling, redness and pain in his right testicle  For these reasons, he came into the ER 2 days ago  On presentation, patient did not have fever but had leukocytosis  Testicular ultrasound showed epididymitis and a complex right collection and the head of the epididymis  Patient was admitted  IV ceftriaxone was started  We are asked to evaluate the patient  Since admission, patient has felt a lot better  Pain and swelling of his right testicle is improved, although still present  Patient did not have fever/chills at home  Temperature has remained in the normal range here  He has no further urinary symptoms  Urology evaluate the patient  Given clinical improvement, no surgical intervention is being planned  Patient had seen a urologist in the past for elevated PS A  He also had penile implant  However, he has not seen a urologist for many years  REVIEW OF SYSTEMS:  A complete 12 point system-based review of systems is otherwise negative      PAST MEDICAL HISTORY:  Past Medical History:   Diagnosis Date    Aortic stenosis     Atrial fibrillation (HCC)     Cellulitis of hand, left     Cellulitis of hand, right     CH[facility]congestive [facility] failure) [facility] [address] [facility]     Coronary artery disease     Diabetes mellitus ([facility] [address] [facility]     GI hemorrhage     Gout     Hyperlipidemia     Hypertension     Lung mass     Nonspecific abnormal findings on imaging of lung     Thrombocytopenia (HCC)     Vitamin D deficiency      Past Surgical History:   Procedure Laterality Date    CARDIAC SURGERY      CORONARY ARTERY BYPASS GRAFT      FRACTURE SURGERY      HIP SURGERY Left     JOINT REPLACEMENT      KNEE SURGERY Left      Problem list reviewed  FAMILY HISTORY:  Non-contributory    SOCIAL HISTORY:  History   Alcohol Use No     Comment: (history)     History   Drug Use No     History   Smoking Status    Former Smoker   Smokeless Tobacco    Never Used       ALLERGIES:  Allergies   Allergen Reactions    Flovent [Fluticasone]     Medical Tape     Other        MEDICATIONS:  All current active medications have been reviewed  Patient is currently on IV ceftriaxone  PHYSICAL EXAM:  Vitals:  HR:  [52-58] 52  Resp:  [16-18] 18  BP: (128-149)/(62-70) 149/62  SpO2:  [95 %-97 %] 95 %  Temp (24hrs), Av 2 °F (36 8 °C), Min:97 5 °F (36 4 °C), Max:98 7 °F (37 1 °C)  Current: Temperature: 98 4 °F (36 9 °C)     Physical Exam:  General:  Well-nourished, well-developed, in no acute distress  Awake, alert and oriented x 3  Eyes:  Conjunctive clear with no hemorrhages or effusions  Oropharynx:  No ulcers, no lesions, pharynx benign, no tonsillitis  Neck:  Supple, no lymphadenopathy, no mass, nontender  Lungs:  Expansion symmetric, no rales, no wheezing, no accessory muscle use  Cardiac:  Regular rate and rhythm, normal S1, normal S2, no murmurs  Abdomen:  Soft, nondistended, non-tender, no HSM  :  Bilateral scrotal erythema and warmth, much more prominent right  Right testicle with induration and moderate tenderness  No fluctuance  Extremities:  1+ edema, no erythema, nontender   No ulcers  Skin:  No rashes, no ulcers  Neurological:  Moves all four extremities spontaneously, sensation grossly intact    LABS, IMAGING, & OTHER STUDIES:  Lab Results:  I have personally reviewed pertinent labs     Results from last 7 days  Lab Units 05/25/18  0450 05/24/18  0448 05/23/18  1613   SODIUM mmol/L 136 135* 133*   POTASSIUM mmol/L 3 9 4 2 4 3   CHLORIDE mmol/L 102 101 100   CO2 mmol/L 24 25 25   ANION GAP mmol/L 10 9 8   BUN mg/dL 46* 51* 55*   CREATININE mg/dL 1 06 1 25 1 26   EGFR ml/min/1 73sq m 65 53 53   GLUCOSE RANDOM mg/dL 140 139 153*   CALCIUM mg/dL 8 8 8 8 8 7   AST U/L 26  --   --    ALT U/L 18  --   --    ALK PHOS U/L 388*  --   --    TOTAL PROTEIN g/dL 6 6  --   --    BILIRUBIN TOTAL mg/dL 1 40*  --   --        Results from last 7 days  Lab Units 05/25/18  0450 05/24/18  0448 05/23/18  1613   WBC Thousand/uL 9 37 11 15* 13 16*   HEMOGLOBIN g/dL 13 0 13 3 12 9   PLATELETS Thousands/uL 179 185 168       Results from last 7 days  Lab Units 05/23/18  2108 05/23/18  1614   BLOOD CULTURE  No Growth at 24 hrs  No Growth at 24 hrs   --    URINE CULTURE   --  No Growth <1000 cfu/mL       Imaging Studies:   I have personally reviewed pertinent imaging study reports and images in PACS  Scrotum and testicle ultrasound reviewed  There is a complex collection in the head of the epididymis on the right side  There is marked inflammation of the right scrotum  EKG, Pathology, and Other Studies:   I have personally reviewed pertinent reports

## 2018-05-25 NOTE — PROGRESS NOTES
[facility] Progress Note  Patient: Kianna Carlos 81 y o  male   MRN: [id number]  PCP: Diane Mejia DO  Unit/Bed#: -01 Encounter: [id number]  Date Of Visit: 05/25/18    Assessment/Plan:    Principal Problem:    Epididymoorchitis  Active Problems:    Chronic a-fib ([address] )    Type 2 diabetes mellitus with hyperglycemia ([facility] [address] )    Essential hypertension    Chronic combined systolic and diastolic congestive heart failure (HCC)    Edema    Hyponatremia    Lymphedema of both lower extremities    Present on Admission:   Epididymoorchitis   Type 2 diabetes mellitus with hyperglycemia ([address] )   Essential hypertension   Chronic a-fib ([address] )   Edema   Chronic combined systolic and diastolic congestive heart failure (HCC)   Hyponatremia   Lymphedema of both lower extremities      · Epididymal orchitis with abscess  As per Urology, no need for any incision or drainage  It is still someone/heart and pretty tender to touch  He was on ciprofloxacin prior to coming to the hospital   Repeat urine culture negative likely secondary to him being on antibiotics  Would consult Infectious Disease consultant to assess need for home IV antibiotics or putting him back on longer course of oral antibiotics  · Chronic atrial fibrillation  Continue with current treatment  Also on Eliquis for anticoagulation  · Lymphedema  Continue to use lymphedema pumps  · Chronic combined systolic and diastolic heart failure  Not in any exacerbation at this moment  · Hyponatremia-hemodynamic  Stable and continue to monitor as needed  · Diabetes mellitus type 2  Continue with current treatment including sliding scale      VTE Pharmacologic Prophylaxis:   Pharmacologic: Apixaban (Eliquis)  Mechanical VTE Prophylaxis in Place: Yes    Patient Centered Rounds: I have performed bedside rounds with nursing staff today      Discussions with Specialists or Other Care Team Provider:  Yes    Education and Discussions with Family / Patient:  Yes        Current Length of Stay: 2 day(s)    Current Patient Status: Inpatient   Certification Statement: The patient will continue to require additional inpatient hospital stay due to IV antibiotics    Discharge Plan:  Home when stable    Code Status: Level 1 - Full Code      Subjective:   Feels little better  He thinks that his testicles are less heart especially the right one and has less pain  No fever or chills  No nausea, vomiting, or diarrhea    Objective:     Vitals:   Temp (24hrs), Av 2 °F (36 8 °C), Min:97 5 °F (36 4 °C), Max:98 7 °F (37 1 °C)    HR:  [52-58] 52  Resp:  [16-18] 18  BP: (128-149)/(62-70) 149/62  SpO2:  [95 %-97 %] 95 %  Body mass index is 34 48 kg/m²  Input and Output Summary (last 24 hours): Intake/Output Summary (Last 24 hours) at 18 1120  Last data filed at 18 1001   Gross per 24 hour   Intake                0 ml   Output             1775 ml   Net            -1775 ml           Physical Exam:     Vital signs are reviewed as above  Constitutional:  Lying in bed  Lymphedema pump just taken off-has less edema of his legs after using the pump   Eyes: EOM grossly intact  Conjunctivae slightly pale  Patient has anicteric sclera  HENT: Oropharynx are moist    Neck: Neck is supple  Cardiac: I did not hear any rubs or gallop  Patient has irregularly irregular  rhythm  Respiratory: Patient not in significant respiratory distress  Air entry in general is fair with decreased breath sounds at the bases  GI: Abdomen is soft  It is grossly nontender  Bowel sounds are adequate  I was not able to appreciate any hepatosplenomegaly  Genitourinary:  He has somewhat less swelling and redness and tenderness of his testicles  Also some less heart right testicle  Neurologic:  Patient is awake and alert  Neurological examination is grossly intact  No obvious focal neurological deficit noticed  Skin: Skin is warm and dry    Red/erythematous scrotum   Psychiatric: Mood and affect are pleasant  Musculoskeletal  Patient moving all extremities   Has chronic arthritic joints   Extremities: Patient has less edema of his legs  Has an open area left lower leg above his left ankle which dressed        Additional Data:     Labs:      Results from last 7 days  Lab Units 05/25/18  0450  05/23/18  1613   WBC Thousand/uL 9 37  < > 13 16*   HEMOGLOBIN g/dL 13 0  < > 12 9   HEMATOCRIT % 39 6  < > 39 7   PLATELETS Thousands/uL 179  < > 168   NEUTROS PCT %  --   --  80*   LYMPHS PCT %  --   --  10*   MONOS PCT %  --   --  8   EOS PCT %  --   --  1   < > = values in this interval not displayed  Results from last 7 days  Lab Units 05/25/18  0450   SODIUM mmol/L 136   POTASSIUM mmol/L 3 9   CHLORIDE mmol/L 102   CO2 mmol/L 24   BUN mg/dL 46*   CREATININE mg/dL 1 06   CALCIUM mg/dL 8 8   TOTAL PROTEIN g/dL 6 6   BILIRUBIN TOTAL mg/dL 1 40*   ALK PHOS U/L 388*   ALT U/L 18   AST U/L 26   GLUCOSE RANDOM mg/dL 140           * I Have Reviewed All Lab Data Listed Above  * Additional Pertinent Lab Tests Reviewed: All Labs Within Last 24 Hours Reviewed      Recent Cultures (last 7 days):       Results from last 7 days  Lab Units 05/23/18  2108 05/23/18  1614   BLOOD CULTURE  No Growth at 24 hrs    No Growth at 24 hrs   --    URINE CULTURE   --  No Growth <1000 cfu/mL       Last 24 Hours Medication List:     Current Facility-Administered Medications:  acetaminophen 650 mg Oral Q6H PRN Arcelia Cameron PA-C    allopurinol 300 mg Oral Daily Minerva Hudson PA-C    apixaban 5 mg Oral BID Minerva Hudson PA-C    aspirin 81 mg Oral Daily Minerva Hudson PA-C    cefTRIAXone 2,000 mg Intravenous Q24H Avery Humphries MD Last Rate: 2,000 mg (05/24/18 1714)   docusate sodium 100 mg Oral Q12H Minerva Hudson PA-C    insulin glargine 16 Units Subcutaneous HS Minerva Hudson PA-C    insulin lispro 1-5 Units Subcutaneous TID AC Minerva Hudson PA-C    insulin lispro 1-5 Units Subcutaneous HS Arcelia Bees, PA-C    isosorbide mononitrate 30 mg Oral Daily Minerva B [place], PA-C    metoprolol succinate 25 mg Oral Daily Minerva B [place], PA-C    potassium chloride 20 mEq Oral Q12H Minerva B [place], PA-C    torsemide 40 mg Oral Daily Minerva B Becca, PA-C    traMADol 50 mg Oral Q6H PRN Jordana Gonzalez MD         Today, Patient Was Seen By: Nu Butt MD    ** Please Note: Dragon 360 Dictation voice to text software may have been used in the creation of this document   **

## 2018-05-25 NOTE — TELEPHONE ENCOUNTER
Mr Joyce Duong is an very pleasant 28-year-old male seen in consultation at [facility] for right epididymitis  Please contact patient with hospital follow-up within 3-4 weeks  Patient is not previously known to our group, therefore no provider preference  Call patient after the holiday weekend  Non urgent    Thank you

## 2018-05-25 NOTE — PLAN OF CARE
Problem: DISCHARGE PLANNING - CARE MANAGEMENT  Goal: Discharge to post-acute care or home with appropriate resources  INTERVENTIONS:  - Conduct assessment to determine patient/family and health care team treatment goals, and need for post-acute services based on payer coverage, community resources, and patient preferences, and barriers to discharge  - Address psychosocial, clinical, and financial barriers to discharge as identified in assessment in conjunction with the patient/family and health care team  - Arrange appropriate level of post-acute services according to patients   needs and preference and payer coverage in collaboration with the physician and health care team  - Communicate with and update the patient/family, physician, and health care team regarding progress on the discharge plan  - Arrange appropriate transportation to post-acute venues  Outcome: Progressing  Patient declining vna at this time  Cm to assist with bird appointments as needed  Patient now on iv cefazolin,

## 2018-05-25 NOTE — PROGRESS NOTES
Progress Note - Luis Manuel Mckeon 81 y o  male MRN: [id number]    Unit/Bed#: -01 Encounter: [id number]      Assessment:  Mr Laya Oakley is an 44-year-old man refer to [facility] for right testicular swelling found to have the 0 right epididymitis with small 1 2 centimeter abscess  Patient was started on empiric ceftriaxone  Initial urine culture showed moderate E coli presents  Repeat urine and blood cultures are negative for growth of organisms  Patient remains afebrile and reports significant improvement with pain and swelling  White count is now normalized  Patient is voiding spontaneously without complaints; more than 1 2 liters overnight  Plan:  Continue medical optimization, symptom management and sterilization  Local care to include meticulous hygienic care, elevation and hot/cold application per patient preference  Scrotal examination is showing significant improvement  There is no requirement for percutaneous drainage  Discharge per primary team when ready  Our office will contact patient with hospital follow-up appointment date and time  Subjective:  I can move around more        Objective:     Vitals: Blood pressure 149/62, pulse (!) 52, temperature 98 4 °F (36 9 °C), temperature source Oral, resp  rate 18, height 5' 10" (1 778 m), weight 109 kg (240 lb 4 8 oz), SpO2 95 %  ,Body mass index is 34 48 kg/m²        Intake/Output Summary (Last 24 hours) at 05/25/18 6334  Last data filed at 05/25/18 0300   Gross per 24 hour   Intake                0 ml   Output             1475 ml   Net            -1475 ml       Physical Exam: General appearance: alert and oriented, in no acute distress  Head: Normocephalic, without obvious abnormality, atraumatic  Neck: no adenopathy, no carotid bruit, no JVD, supple, symmetrical, trachea midline and thyroid not enlarged, symmetric, no tenderness/mass/nodules  Lungs: clear to auscultation bilaterally  Heart: regular rate and rhythm, S1, S2 normal, no murmur, click, rub or gallop  Abdomen: soft, non-tender; bowel sounds normal; no masses,  no organomegaly  Extremities: extremities normal, warm and well-perfused; no cyanosis, clubbing, or edema  Pulses: 2+ and symmetric  Neurologic: Grossly normal  No urinary drains  Genitalia--uncircumcised male with fully retractable foreskin  Normal phallus  Bilaterally descended testicles with right moderate edema and erythema  Firm to touch with significant improvement from yesterday  No crepitus or necrosis  Slightly tender on palpation  Left testicle normal      Invasive Devices     Peripheral Intravenous Line            Peripheral IV 05/23/18 Right Antecubital 1 day              Lab Results   Component Value Date    WBC 9 37 05/25/2018    HGB 13 0 05/25/2018    HCT 39 6 05/25/2018    MCV 90 05/25/2018     05/25/2018     Lab Results   Component Value Date    GLUCOSE 140 05/25/2018    CALCIUM 8 8 05/25/2018     05/25/2018    K 3 9 05/25/2018    CO2 24 05/25/2018     05/25/2018    BUN 46 (H) 05/25/2018    CREATININE 1 06 05/25/2018       Lab, Imaging and other studies: I have personally reviewed pertinent reports

## 2018-05-25 NOTE — SOCIAL WORK
Cm met with patient regarding decision to dc with vna  Patient reported he did not feel the need for vna as he remains self sufficient with his care  Patient confirms using a straight cane to ambulate and drives independently  Cm will assist with NILDA appointments as needed

## 2018-05-26 VITALS
TEMPERATURE: 98.4 F | SYSTOLIC BLOOD PRESSURE: 134 MMHG | WEIGHT: 240.3 LBS | OXYGEN SATURATION: 99 % | RESPIRATION RATE: 18 BRPM | HEART RATE: 50 BPM | DIASTOLIC BLOOD PRESSURE: 65 MMHG | BODY MASS INDEX: 34.4 KG/M2 | HEIGHT: 70 IN

## 2018-05-26 LAB
ALBUMIN SERPL BCP-MCNC: 2 G/DL (ref 3.5–5)
ALP SERPL-CCNC: 402 U/L (ref 46–116)
ALT SERPL W P-5'-P-CCNC: 18 U/L (ref 12–78)
ANION GAP SERPL CALCULATED.3IONS-SCNC: 9 MMOL/L (ref 4–13)
AST SERPL W P-5'-P-CCNC: 31 U/L (ref 5–45)
BILIRUB SERPL-MCNC: 1.2 MG/DL (ref 0.2–1)
BUN SERPL-MCNC: 40 MG/DL (ref 5–25)
CALCIUM SERPL-MCNC: 8.5 MG/DL (ref 8.3–10.1)
CHLORIDE SERPL-SCNC: 104 MMOL/L (ref 100–108)
CO2 SERPL-SCNC: 24 MMOL/L (ref 21–32)
CREAT SERPL-MCNC: 1.03 MG/DL (ref 0.6–1.3)
ERYTHROCYTE [DISTWIDTH] IN BLOOD BY AUTOMATED COUNT: 16.8 % (ref 11.6–15.1)
GFR SERPL CREATININE-BSD FRML MDRD: 67 ML/MIN/1.73SQ M
GLUCOSE SERPL-MCNC: 102 MG/DL (ref 65–140)
GLUCOSE SERPL-MCNC: 93 MG/DL (ref 65–140)
HCT VFR BLD AUTO: 39.9 % (ref 36.5–49.3)
HGB BLD-MCNC: 13 G/DL (ref 12–17)
MCH RBC QN AUTO: 29.5 PG (ref 26.8–34.3)
MCHC RBC AUTO-ENTMCNC: 32.6 G/DL (ref 31.4–37.4)
MCV RBC AUTO: 91 FL (ref 82–98)
PLATELET # BLD AUTO: 167 THOUSANDS/UL (ref 149–390)
PMV BLD AUTO: 12.6 FL (ref 8.9–12.7)
POTASSIUM SERPL-SCNC: 3.7 MMOL/L (ref 3.5–5.3)
PROT SERPL-MCNC: 6.4 G/DL (ref 6.4–8.2)
RBC # BLD AUTO: 4.4 MILLION/UL (ref 3.88–5.62)
SODIUM SERPL-SCNC: 137 MMOL/L (ref 136–145)
WBC # BLD AUTO: 6.61 THOUSAND/UL (ref 4.31–10.16)

## 2018-05-26 PROCEDURE — 85027 COMPLETE CBC AUTOMATED: CPT | Performed by: INTERNAL MEDICINE

## 2018-05-26 PROCEDURE — 82948 REAGENT STRIP/BLOOD GLUCOSE: CPT

## 2018-05-26 PROCEDURE — 80053 COMPREHEN METABOLIC PANEL: CPT | Performed by: INTERNAL MEDICINE

## 2018-05-26 PROCEDURE — 99238 HOSP IP/OBS DSCHRG MGMT 30/<: CPT | Performed by: INTERNAL MEDICINE

## 2018-05-26 RX ORDER — TRAMADOL HYDROCHLORIDE 50 MG/1
50 TABLET ORAL EVERY 6 HOURS PRN
Qty: 30 TABLET | Refills: 0 | Status: SHIPPED | OUTPATIENT
Start: 2018-05-26 | End: 2018-06-05

## 2018-05-26 RX ORDER — CEPHALEXIN 500 MG/1
500 CAPSULE ORAL EVERY 6 HOURS SCHEDULED
Qty: 56 CAPSULE | Refills: 2 | Status: SHIPPED | OUTPATIENT
Start: 2018-05-26 | End: 2018-06-09

## 2018-05-26 RX ORDER — CEPHALEXIN 500 MG/1
500 CAPSULE ORAL EVERY 6 HOURS SCHEDULED
Status: DISCONTINUED | OUTPATIENT
Start: 2018-05-26 | End: 2018-05-26 | Stop reason: HOSPADM

## 2018-05-26 RX ADMIN — TORSEMIDE 40 MG: 20 TABLET ORAL at 09:47

## 2018-05-26 RX ADMIN — ASPIRIN 81 MG 81 MG: 81 TABLET ORAL at 09:48

## 2018-05-26 RX ADMIN — CEFAZOLIN SODIUM 1000 MG: 1 SOLUTION INTRAVENOUS at 05:54

## 2018-05-26 RX ADMIN — APIXABAN 5 MG: 5 TABLET, FILM COATED ORAL at 09:53

## 2018-05-26 RX ADMIN — ISOSORBIDE MONONITRATE 30 MG: 30 TABLET, EXTENDED RELEASE ORAL at 09:47

## 2018-05-26 RX ADMIN — CEPHALEXIN 500 MG: 500 CAPSULE ORAL at 11:17

## 2018-05-26 RX ADMIN — METOPROLOL SUCCINATE 25 MG: 25 TABLET, EXTENDED RELEASE ORAL at 09:48

## 2018-05-26 RX ADMIN — POTASSIUM CHLORIDE 20 MEQ: 1500 TABLET, EXTENDED RELEASE ORAL at 09:53

## 2018-05-26 RX ADMIN — ALLOPURINOL 300 MG: 300 TABLET ORAL at 09:47

## 2018-05-26 RX ADMIN — TRAMADOL HYDROCHLORIDE 50 MG: 50 TABLET, COATED ORAL at 09:47

## 2018-05-26 NOTE — SOCIAL WORK
CM reviewed IMM with pt  Pt and family are in agreement  Pt had no questions and signed  CM provided pt with a copy and placed original in pt's medical record for scanning  Pt has no other needs at this time

## 2018-05-26 NOTE — DISCHARGE SUMMARY
Discharge Summary - [facility] [facility]    Patient Information: Giuliano Flanagan 81 y o  male MRN: [id number]  Unit/Bed#: -01 Encounter: [id number]    Discharging Physician / Practitioner: Kaye Chan MD  PCP: Angle Rodarte DO  Admission Date: 5/23/2018  Discharge Date: 05/26/18    Reason for Admission:  Epididymo-orchitis   with abscess    Discharge Diagnoses:     Principal Problem:    Epididymoorchitis  Active Problems:    Chronic a-fib ([address] )    Type 2 diabetes mellitus with hyperglycemia ([address] )    Essential hypertension    Chronic combined systolic and diastolic congestive heart failure ([address] )    Edema    Hyponatremia    Lymphedema of both lower extremities  Resolved Problems:    * No resolved hospital problems  *    Present on Admission:   Epididymoorchitis   Type 2 diabetes mellitus with hyperglycemia (HCC)   Essential hypertension   Chronic a-fib (HCC)   Edema   Chronic combined systolic and diastolic congestive heart failure (HCC)   Hyponatremia   Lymphedema of both lower extremities    Consultations During Hospital Stay:  · Urology  · Infectious Disease    Procedures Performed:     · Ultrasound-scrotum    Significant Findings:     1  Markedly enlarged and hyperemic right epididymis consistent with epididymitis  Complex collection in the head of the epididymis measuring 1 2 cm likely to represent an organizing abscess  No evidence of testicular torsion or orchitis  2   Small right hydrocele, likely reactive  3   Marked inflammation or edema in the right aspect of the scrotum  Incidental Findings:   · As above     Test Results Pending at Discharge (will require follow up):    · None     Outpatient Tests Requested:  · None, however, patient to get ultrasound on outpatient basis after seen by Infectious Disease consultant/primary care physician/urology service within the next week    Complications:  None    Hospital Course:     Giuliano Flanagan is a 81 y o  male patient who originally presented to the hospital on 5/23/2018 due to increasing pain and swelling of his scrotum/testicles mostly on the right side  He was being treated for urinary tract infection and had just finished the antibiotic course  He was found to have significant swelling, redness, and pain in his testicles especially the right side  Had ultrasound of his scrotum done which showed findings as above  Seen by Urology  Urology did not think that patient needs any incision and drainage and to be treated with IV antibiotics initially  Seen by Infectious Disease consultant  I have discussed with Dr Loi Barroso yesterday and he recommended close follow-up  Antibiotics changed to oral today  I have given him 2 weeks course with couple of refills as he would end up receiving antibiotics for at least few weeks especially if he does not get any incision or drainage  I have also suggested the patient to see consultants sooner rather than late after discharge from the hospital     Patient resume his other home medications on discharge  He has history of coronary artery disease and has had CABG in the past   He is not on any statins  As per patient, he used to be on 1 and he developed significant myalgia S and therefore, was discontinued  This was marked as his allergy as well  Condition at Discharge: fair     Discharge Day Visit / Exam:     Subjective:  Feels better with less pain in his scrotum/testicles especially on the right side  Able to walk better  Vitals: Blood Pressure: 134/65 (05/26/18 0719)  Pulse: (!) 50 (05/26/18 0719)  Temperature: 98 4 °F (36 9 °C) (05/26/18 0719)  Temp Source: Oral (05/26/18 0719)  Respirations: 18 (05/26/18 0719)  Height: 5' 10" (177 8 cm) (05/23/18 2006)  Weight - Scale: 109 kg (240 lb 4 8 oz) (05/26/18 0552)  SpO2: 99 % (05/26/18 0719)  Exam:        Vital signs reviewed as above  Heart rate in the 50s although he is not really symptomatic  Decreased breath sounds at the bases    S1 and S2 audible  Awake and alert  Oriented x3  Abdomen is obese  Has chronic bilateral lower extremities lymphedema with chronic stasis dermatitis skin changes and an open area left lower leg  He has somewhat less swelling, redness, and tenderness of his testicles/scrotum especially on the right side  Discharge instructions/Information to patient and family:   See after visit summary for information provided to patient and family  Provisions for Follow-Up Care:  See after visit summary for information related to follow-up care and any pertinent home health orders  Disposition:     Home    For Discharges to [facility] SNF:   · Not Applicable to this Patient - Not Applicable to this Patient    Planned Readmission:  None     Discharge Statement:  I spent 20+ minutes discharging the patient  This time was spent on the day of discharge  I had direct contact with the patient on the day of discharge  Greater than 50% of the total time was spent examining patient, answering all patient questions, arranging and discussing plan of care with patient as well as directly providing post-discharge instructions  Additional time then spent on discharge activities  Discharge Medications:  See after visit summary for reconciled discharge medications provided to patient and family  ** Please Note: Dragon 360 Dictation voice to text software may have been used in the creation of this document   **

## 2018-05-29 ENCOUNTER — TELEPHONE (OUTPATIENT)
Dept: CARDIOLOGY CLINIC | Facility: CLINIC | Age: 83
End: 2018-05-29

## 2018-05-29 ENCOUNTER — TRANSITIONAL CARE MANAGEMENT (OUTPATIENT)
Dept: INTERNAL MEDICINE CLINIC | Facility: CLINIC | Age: 83
End: 2018-05-29

## 2018-05-29 LAB
BACTERIA BLD CULT: NORMAL
BACTERIA BLD CULT: NORMAL

## 2018-05-29 NOTE — TELEPHONE ENCOUNTER
PER [facility] PT SAID THAT HE WAS IN THE HOSP & THEY UPPED HIS ELIQUIS FROM 2 5MG-5MG & WANTS TO MAKE SURE THAT'S WHAT HE SHOULD BE TAKING

## 2018-05-29 NOTE — TELEPHONE ENCOUNTER
PT SAID THAT HE WAS IN THE HOSP & THEY UPPED HIS ELIQUIS FROM 2 5MG-5MG & WANTS TO MAKE SURE THAT'S WHAT HE SHOULD BE TAKING

## 2018-05-29 NOTE — TELEPHONE ENCOUNTER
Spoke with PT and scheduled for the end of June -6/28/2018  I mailed new patient packet to confirmed new address - [address] in [place]  Also confirmed insurance

## 2018-06-04 ENCOUNTER — OFFICE VISIT (OUTPATIENT)
Dept: INTERNAL MEDICINE CLINIC | Facility: CLINIC | Age: 83
End: 2018-06-04
Payer: MEDICARE

## 2018-06-04 VITALS
HEIGHT: 70 IN | DIASTOLIC BLOOD PRESSURE: 62 MMHG | RESPIRATION RATE: 16 BRPM | WEIGHT: 239 LBS | OXYGEN SATURATION: 97 % | BODY MASS INDEX: 34.22 KG/M2 | SYSTOLIC BLOOD PRESSURE: 110 MMHG | HEART RATE: 57 BPM

## 2018-06-04 DIAGNOSIS — Z95.1 HX OF CABG: ICD-10-CM

## 2018-06-04 DIAGNOSIS — Z79.4 TYPE 2 DIABETES MELLITUS WITH HYPERGLYCEMIA, WITH LONG-TERM CURRENT USE OF INSULIN (HCC): ICD-10-CM

## 2018-06-04 DIAGNOSIS — I10 ESSENTIAL HYPERTENSION: ICD-10-CM

## 2018-06-04 DIAGNOSIS — I48.20 CHRONIC A-FIB (HCC): ICD-10-CM

## 2018-06-04 DIAGNOSIS — E11.65 TYPE 2 DIABETES MELLITUS WITH HYPERGLYCEMIA, WITH LONG-TERM CURRENT USE OF INSULIN (HCC): ICD-10-CM

## 2018-06-04 DIAGNOSIS — N45.2 ORCHITIS, RIGHT: Primary | ICD-10-CM

## 2018-06-04 DIAGNOSIS — I25.10 CORONARY ARTERY DISEASE INVOLVING NATIVE HEART WITHOUT ANGINA PECTORIS, UNSPECIFIED VESSEL OR LESION TYPE: ICD-10-CM

## 2018-06-04 PROCEDURE — 99495 TRANSJ CARE MGMT MOD F2F 14D: CPT | Performed by: INTERNAL MEDICINE

## 2018-06-04 RX ORDER — ALLOPURINOL 300 MG/1
300 TABLET ORAL DAILY
COMMUNITY
End: 2018-06-25 | Stop reason: CLARIF

## 2018-06-04 NOTE — PROGRESS NOTES
Assessment/Plan:  Recovering from his epididymitis  He is going to see the urologist and is finishing up his antibiotics  He is going to see his cardiologist and discuss strength of anticoagulation  I will see him back here in 3 weeks  He seems to be    No problem-specific Assessment & Plan notes found for this encounter  Diagnoses and all orders for this visit:    Orchitis, right    Essential hypertension    Chronic a-fib ([address] )    Coronary artery disease involving native heart without angina pectoris, unspecified vessel or lesion type    Hx of CABG    Type 2 diabetes mellitus with hyperglycemia, with long-term current use of insulin (HCC)    Other orders  -     allopurinol (ZYLOPRIM) 300 mg tablet; Take 300 mg by mouth daily         Subjective:  He was hospitalized with epididymitis  He was treated with IV antibiotics for about 3 or 4 days and then discharged on Keflex  Doing better with shrinking of his testicle  Has multiple problems including atrial fibrillation for which she is anticoagulated  He has a history of gout and type 2 diabetes  He has coronary artery disease  He has chronic atrial fibrillation  She is actually feeling fairly well  Does have arthritis  Has not had any falls  Is happy to be out of the hospital which was about 10 days ago     Patient ID: Shasta Escalante is a 81 y o  male  HPI    Review of Systems   Constitutional: Positive for fatigue  Negative for activity change, appetite change, chills, diaphoresis, fever and unexpected weight change  HENT: Positive for hearing loss  Negative for congestion, ear pain, mouth sores, nosebleeds, postnasal drip, sinus pain, sinus pressure, sore throat and trouble swallowing  Eyes: Negative for pain, discharge and visual disturbance  Respiratory: Positive for shortness of breath  Negative for apnea, cough, chest tightness and wheezing  Cardiovascular: Positive for leg swelling   Negative for chest pain and palpitations  Has chronic atrial fibrillation  Is anticoagulated  Is to discuss with his cardiologist whether to stay on the increased dose of Eliquis which was 5 mg 2 times daily  This was given to him in the hospital   He used to be on 2 5 mg b i d  He has chronic stasis edema of his legs  Gastrointestinal: Negative for abdominal pain, anal bleeding, blood in stool, constipation, diarrhea, nausea and vomiting  Endocrine: Negative for polydipsia and polyphagia  He has type 2 diabetes  Blood sugars are said to be stable  Genitourinary: Negative for decreased urine volume, dysuria, flank pain, frequency, hematuria and urgency  Had epididymitis  Still has a swollen testicles  He is to see the urologist shortly  Musculoskeletal: Negative for arthralgias, back pain, gait problem, joint swelling and myalgias  Skin: Negative for rash and wound  Allergic/Immunologic: Negative for environmental allergies and food allergies  Neurological: Negative for dizziness, tremors, seizures, syncope, speech difficulty, light-headedness, numbness and headaches  Hematological: Negative for adenopathy  Does not bruise/bleed easily  Psychiatric/Behavioral: Negative for agitation, confusion, hallucinations, sleep disturbance and suicidal ideas  The patient is not nervous/anxious  Objective:     Physical Exam   Constitutional: He appears well-developed and well-nourished  No distress  HENT:   Head: Normocephalic  Right Ear: External ear normal    Left Ear: External ear normal    Nose: Nose normal    Mouth/Throat: Oropharynx is clear and moist  No oropharyngeal exudate  Eyes: Conjunctivae and EOM are normal  Pupils are equal, round, and reactive to light  Right eye exhibits no discharge  Left eye exhibits no discharge  Neck: Normal range of motion  Neck supple  No thyromegaly present  Cardiovascular: Normal rate, normal heart sounds and intact distal pulses    Exam reveals no gallop and no friction rub  No murmur heard  Rhythm is irregularly irregular  Pulmonary/Chest: Effort normal  No respiratory distress  He has no wheezes  He has no rales  Breath sounds are diffusely diminished  Abdominal: Soft  Bowel sounds are normal  He exhibits no distension and no mass  There is no tenderness  There is no rebound and no guarding  Genitourinary:   Genitourinary Comments: His right testicle was enlarged  He has a firm appliance in place in the shaft of his penis which she said is a penile implant  Musculoskeletal: Normal range of motion  He exhibits edema  He exhibits no tenderness or deformity  Osteoarthritic changes noted about his knees and hands  Lymphadenopathy:     He has no cervical adenopathy  Neurological: He is alert  He has normal reflexes  No cranial nerve deficit  Coordination normal    Skin: Skin is warm and dry  No rash noted  No erythema  Psychiatric: He has a normal mood and affect  His behavior is normal  Judgment and thought content normal    Nursing note and vitals reviewed  Vitals:    06/04/18 1515   BP: 110/62   Pulse: 57   Resp: 16   SpO2: 97%   Weight: 108 kg (239 lb)   Height: 5' 10" (1 778 m)       Transitional Care Management Review:  Violeta Quezada is a 81 y o  male here for TCM follow up       During the TCM phone call patient stated:    Date and time hospital follow up call was made:  5/29/2018  4:57 PM  Hospital care reviewed:  Records reviewed  Patient was hopsitalized at:  [facility]  Date of admission:  5/23/18  Date of discharge:  5/26/18  Diagnosis:  Epididymoorchitis  Disposition:  Home  Current symptoms:  Swelling (Comment: pain resolving)  Clinical progress swelling:  Improving  Scheduled for follow up?:  Yes  Patients specialists:  Urologist, Other (comment)  Urologist's Name:  n/a  Other specialists Name:  infection control Dr Fidel Cabrera  Did you obtain your prescribed medications:  Yes  Do you need help managing your perscriptions or medications:  No  Is transportation to your appointments needed:  No  I have advised the patient to call PCP with any new or worsening symptoms (please type in name along with any credentials):  Brandon Hong LPN  Living Arrangements:  Children  Are you recieving outpatient services:  No  Are you recieving home care services:  No  Interperter language line required?:  No  Counseling:  Patient  Counseling topics:  Activities of daily living             Gwendolyn Escoto DO

## 2018-06-05 ENCOUNTER — PATIENT OUTREACH (OUTPATIENT)
Dept: FAMILY MEDICINE CLINIC | Facility: CLINIC | Age: 83
End: 2018-06-05

## 2018-06-05 NOTE — PROGRESS NOTES
Patient stated that he is well  He denied pain or discomfort  Is feeling a lot better since last admission on 5/23/18 with diagnosis of epididymoorchitis  Swelling to scrotum has gone down  He stated he has been urinating normal yellow urine  Vitals have been stable  Did make a follow up appointment with urologist  Was also seen by PCP yesterday 6/4/18  Stated that he has been eating well  Has been trying to follow a good diet  Reviewed over s/s of hypo/hyperglycemia  He denied any symptoms  Stated glucose has been stable  Introduced him to care coordination  He is in agreement  Took down contact information  Will follow up

## 2018-06-08 ENCOUNTER — TELEPHONE (OUTPATIENT)
Dept: CARDIOLOGY CLINIC | Facility: CLINIC | Age: 83
End: 2018-06-08

## 2018-06-08 DIAGNOSIS — I48.91 ATRIAL FIBRILLATION, UNSPECIFIED TYPE (HCC): ICD-10-CM

## 2018-06-08 NOTE — TELEPHONE ENCOUNTER
Patient needs refill for Eliquis 5mg, for twice a day  Patient stated his medication was changed at the hospital  Please send refill to [facility] on file

## 2018-06-11 ENCOUNTER — OFFICE VISIT (OUTPATIENT)
Dept: CARDIOLOGY CLINIC | Facility: CLINIC | Age: 83
End: 2018-06-11
Payer: MEDICARE

## 2018-06-11 VITALS
HEIGHT: 70 IN | OXYGEN SATURATION: 96 % | SYSTOLIC BLOOD PRESSURE: 132 MMHG | DIASTOLIC BLOOD PRESSURE: 70 MMHG | BODY MASS INDEX: 34.79 KG/M2 | HEART RATE: 64 BPM | WEIGHT: 243 LBS

## 2018-06-11 DIAGNOSIS — I25.10 CORONARY ARTERY DISEASE INVOLVING NATIVE HEART WITHOUT ANGINA PECTORIS, UNSPECIFIED VESSEL OR LESION TYPE: ICD-10-CM

## 2018-06-11 DIAGNOSIS — I35.0 NONRHEUMATIC AORTIC VALVE STENOSIS: ICD-10-CM

## 2018-06-11 DIAGNOSIS — Z79.01 CHRONIC ANTICOAGULATION: ICD-10-CM

## 2018-06-11 DIAGNOSIS — I50.42 CHRONIC COMBINED SYSTOLIC AND DIASTOLIC CONGESTIVE HEART FAILURE (HCC): Primary | ICD-10-CM

## 2018-06-11 DIAGNOSIS — I50.42 CHRONIC COMBINED SYSTOLIC AND DIASTOLIC CHF (CONGESTIVE HEART FAILURE) (HCC): Primary | ICD-10-CM

## 2018-06-11 DIAGNOSIS — I10 ESSENTIAL HYPERTENSION: ICD-10-CM

## 2018-06-11 DIAGNOSIS — I48.20 CHRONIC A-FIB (HCC): ICD-10-CM

## 2018-06-11 PROCEDURE — 99214 OFFICE O/P EST MOD 30 MIN: CPT | Performed by: INTERNAL MEDICINE

## 2018-06-11 RX ORDER — METOLAZONE 2.5 MG/1
2.5 TABLET ORAL DAILY
Qty: 30 TABLET | Refills: 8 | Status: SHIPPED | OUTPATIENT
Start: 2018-06-11

## 2018-06-11 RX ORDER — METOLAZONE 2.5 MG/1
2.5 TABLET ORAL DAILY
COMMUNITY
End: 2018-06-11 | Stop reason: SDUPTHER

## 2018-06-11 NOTE — PROGRESS NOTES
[facility] [place]  [address] [facility]  [id number]  Cardiology Follow Up    Sal Mcneal  1935  [id number]      1  Chronic combined systolic and diastolic congestive heart failure ([address] )     2  Coronary artery disease involving native heart without angina pectoris, unspecified vessel or lesion type     3  Essential hypertension     4  Chronic a-fib ([facility] [address] )     5  Chronic anticoagulation         Chief Complaint   Patient presents with    Follow-up       Interval History:  Patient presents for follow-up visit  Patient denies any history of chest pain  Patient does have some shortness of breath and chronic lower extremity edema  Patient was recently hospitalized for epididymo orchitis with abscess  Patient was treated with antibiotics  Patient is on Eliquis 5 mg twice a day  Patient is also on baby aspirin 81 mg daily  Patient has been having excessive bruising  Patient's renal functions have been within normal limits in the recent past   Patient would about excessive bruising  Family present during the office visit  Patient states that he has been compliant with all his present medications  Patient takes occasional metolazone as needed for weight gain and significant shortness of breath as well as lower extremity edema      Patient Active Problem List   Diagnosis    Cellulitis    Chronic a-fib (HCC)    Type 2 diabetes mellitus with hyperglycemia ([facility] )    Essential hypertension    Chronic combined systolic and diastolic congestive heart failure (HCC)    Edema    H/O thyroid cyst    CAD (coronary artery disease)    Hx of CABG    Hyponatremia    Acute cystitis with hematuria    Epididymoorchitis    Lymphedema of both lower extremities    Chronic anticoagulation     Past Medical History:   Diagnosis Date    Aortic stenosis     Atrial fibrillation (HCC)     Cellulitis of hand, left     Cellulitis of hand, right     CHF (congestive heart failure) ([facility] )     Coronary artery disease     Diabetes mellitus ([facility] )     GI hemorrhage     Gout     Hyperlipidemia     Hypertension     Lung mass     Nonspecific abnormal findings on imaging of lung     Thrombocytopenia (HCC)     Vitamin D deficiency      Social History     Social History    Marital status:      Spouse name: N/A    Number of children: N/A    Years of education: N/A     Occupational History    Not on file  Social History Main Topics    Smoking status: Former Smoker    Smokeless tobacco: Never Used    Alcohol use No      Comment: (history)    Drug use: No    Sexual activity: Not Currently     Partners: Female     Other Topics Concern    Not on file     Social History Narrative    Active advance directive          Family History   Problem Relation Age of Onset    No Known Problems Father     Cancer Family     Diabetes Family     Heart disease Family      Past Surgical History:   Procedure Laterality Date    CARDIAC SURGERY      CORONARY ARTERY BYPASS GRAFT      FRACTURE SURGERY      HIP SURGERY Left     JOINT REPLACEMENT      KNEE SURGERY Left        Current Outpatient Prescriptions:     allopurinol (ZYLOPRIM) 300 mg tablet, Take 300 mg by mouth daily, Disp: , Rfl:     apixaban (ELIQUIS) 5 mg, Take 1 tablet (5 mg total) by mouth 2 (two) times a day, Disp: 60 tablet, Rfl: 11    aspirin 81 MG tablet, Take 1 tablet by mouth daily, Disp: , Rfl:     colchicine (COLCRYS) 0 6 mg tablet, Take 1 tablet (0 6 mg total) by mouth daily, Disp: 90 tablet, Rfl: 0    insulin aspart (NovoLOG) 100 units/mL injection, Inject 30 Units under the skin 3 (three) times a day before meals  , Disp: , Rfl:     insulin degludec (TRESIBA) injection pen 100 units/mL, Inject under the skin daily 16 UNIT DAILY AT BEDTIME, Disp: , Rfl:     Insulin Syringe-Needle U-100 (ULTICARE INSULIN SYRINGE) 31G X 5/16" 1 ML MISC, by Does not apply route, Disp: , Rfl:     isosorbide mononitrate (IMDUR) 30 mg 24 hr tablet, Take 1 tablet (30 mg total) by mouth daily, Disp: 90 tablet, Rfl: 0    KLOR-CON M20 20 MEQ tablet, TAKE 1 TABLET BY MOUTH EVERY 12 HOURS, Disp: 180 tablet, Rfl: 1    metolazone (ZAROXOLYN) 2 5 mg tablet, Take 2 5 mg by mouth daily, Disp: , Rfl:     metoprolol succinate (TOPROL-XL) 25 mg 24 hr tablet, Take 1 tablet (25 mg total) by mouth daily, Disp: 90 tablet, Rfl: 0    torsemide (DEMADEX) 20 mg tablet, Take 2 tablets (40 mg total) by mouth daily  , Disp: 60 tablet, Rfl: 0    docusate sodium (COLACE) 100 mg capsule, Take 1 capsule by mouth every 12 (twelve) hours for 30 days, Disp: 60 capsule, Rfl: 0  Allergies   Allergen Reactions    Flovent [Fluticasone]     Medical Tape     Other     Statins Myalgia       Labs:  Admission on 05/23/2018, Discharged on 05/26/2018   Component Date Value    WBC 05/23/2018 13 16*    RBC 05/23/2018 4 39     Hemoglobin 05/23/2018 12 9     Hematocrit 05/23/2018 39 7     MCV 05/23/2018 90     MCH 05/23/2018 29 4     MCHC 05/23/2018 32 5     RDW 05/23/2018 16 8*    MPV 05/23/2018 12 7     Platelets 08/18/3304 168     nRBC 05/23/2018 0     Neutrophils Relative 05/23/2018 80*    Immat GRANS % 05/23/2018 1     Lymphocytes Relative 05/23/2018 10*    Monocytes Relative 05/23/2018 8     Eosinophils Relative 05/23/2018 1     Basophils Relative 05/23/2018 0     Neutrophils Absolute 05/23/2018 10 58*    Immature Grans Absolute 05/23/2018 0 12     Lymphocytes Absolute 05/23/2018 1 29     Monocytes Absolute 05/23/2018 1 06     Eosinophils Absolute 05/23/2018 0 06     Basophils Absolute 05/23/2018 0 05     Sodium 05/23/2018 133*    Potassium 05/23/2018 4 3     Chloride 05/23/2018 100     CO2 05/23/2018 25     Anion Gap 05/23/2018 8     BUN 05/23/2018 55*    Creatinine 05/23/2018 1 26     Glucose 05/23/2018 153*    Calcium 05/23/2018 8 7     eGFR 05/23/2018 53     Color, UA 05/23/2018 Yellow     Clarity, UA 05/23/2018 Clear     Specific Gravity, UA 05/23/2018 1 010     pH, UA 05/23/2018 5 5     Leukocytes, UA 05/23/2018 Small*    Nitrite, UA 05/23/2018 Negative     Protein, UA 05/23/2018 Negative     Glucose, UA 05/23/2018 Negative     Ketones, UA 05/23/2018 Negative     Urobilinogen, UA 05/23/2018 0 2     Bilirubin, UA 05/23/2018 Negative     Blood, UA 05/23/2018 Small*    RBC, UA 05/23/2018 2-4*    WBC, UA 05/23/2018 4-10*    Epithelial Cells 05/23/2018 Occasional     Bacteria, UA 05/23/2018 None Seen     Hyaline Casts, UA 05/23/2018 0-1*    Urine Culture 05/23/2018 No Growth <1000 cfu/mL     Blood Culture 05/23/2018 No Growth After 5 Days   Blood Culture 05/23/2018 No Growth After 5 Days       POC Glucose 05/23/2018 218*    Sodium 05/24/2018 135*    Potassium 05/24/2018 4 2     Chloride 05/24/2018 101     CO2 05/24/2018 25     Anion Gap 05/24/2018 9     BUN 05/24/2018 51*    Creatinine 05/24/2018 1 25     Glucose 05/24/2018 139     Calcium 05/24/2018 8 8     eGFR 05/24/2018 53     WBC 05/24/2018 11 15*    RBC 05/24/2018 4 51     Hemoglobin 05/24/2018 13 3     Hematocrit 05/24/2018 40 6     MCV 05/24/2018 90     MCH 05/24/2018 29 5     MCHC 05/24/2018 32 8     RDW 05/24/2018 16 8*    Platelets 92/59/6589 185     MPV 05/24/2018 13 4*    POC Glucose 05/24/2018 133     POC Glucose 05/24/2018 150*    POC Glucose 05/24/2018 215*    POC Glucose 05/24/2018 229*    WBC 05/25/2018 9 37     RBC 05/25/2018 4 39     Hemoglobin 05/25/2018 13 0     Hematocrit 05/25/2018 39 6     MCV 05/25/2018 90     MCH 05/25/2018 29 6     MCHC 05/25/2018 32 8     RDW 05/25/2018 16 8*    Platelets 10/11/3704 179     MPV 05/25/2018 12 9*    Sodium 05/25/2018 136     Potassium 05/25/2018 3 9     Chloride 05/25/2018 102     CO2 05/25/2018 24     Anion Gap 05/25/2018 10     BUN 05/25/2018 46*    Creatinine 05/25/2018 1 06     Glucose 05/25/2018 140     Calcium 05/25/2018 8 8     AST 05/25/2018 26     ALT 05/25/2018 18     Alkaline Phosphatase 05/25/2018 388*    Total Protein 05/25/2018 6 6     Albumin 05/25/2018 2 1*    Total Bilirubin 05/25/2018 1 40*    eGFR 05/25/2018 65     POC Glucose 05/25/2018 142*    POC Glucose 05/25/2018 139     POC Glucose 05/25/2018 178*    POC Glucose 05/25/2018 172*    POC Glucose 05/25/2018 190*    Sodium 05/26/2018 137     Potassium 05/26/2018 3 7     Chloride 05/26/2018 104     CO2 05/26/2018 24     Anion Gap 05/26/2018 9     BUN 05/26/2018 40*    Creatinine 05/26/2018 1 03     Glucose 05/26/2018 102     Calcium 05/26/2018 8 5     AST 05/26/2018 31     ALT 05/26/2018 18     Alkaline Phosphatase 05/26/2018 402*    Total Protein 05/26/2018 6 4     Albumin 05/26/2018 2 0*    Total Bilirubin 05/26/2018 1 20*    eGFR 05/26/2018 67     WBC 05/26/2018 6 61     RBC 05/26/2018 4 40     Hemoglobin 05/26/2018 13 0     Hematocrit 05/26/2018 39 9     MCV 05/26/2018 91     MCH 05/26/2018 29 5     MCHC 05/26/2018 32 6     RDW 05/26/2018 16 8*    Platelets 12/22/9172 167     MPV 05/26/2018 12 6     POC Glucose 05/26/2018 93    Appointment on 05/14/2018   Component Date Value    WBC 05/14/2018 9 19     RBC 05/14/2018 4 75     Hemoglobin 05/14/2018 14 4     Hematocrit 05/14/2018 43 3     MCV 05/14/2018 91     MCH 05/14/2018 30 3     MCHC 05/14/2018 33 3     RDW 05/14/2018 17 0*    Platelets 95/30/2263 166     nRBC 05/14/2018 0     Neutrophils Relative 05/14/2018 65     Lymphocytes Relative 05/14/2018 20     Monocytes Relative 05/14/2018 13*    Eosinophils Relative 05/14/2018 2     Basophils Relative 05/14/2018 0     Neutrophils Absolute 05/14/2018 5 95     Lymphocytes Absolute 05/14/2018 1 86     Monocytes Absolute 05/14/2018 1 19     Eosinophils Absolute 05/14/2018 0 15     Basophils Absolute 05/14/2018 0 02     Urine Culture 05/14/2018 60,000-69,000 cfu/ml Escherichia coli*   Appointment on 05/08/2018   Component Date Value    Hemoglobin A1C 05/08/2018 8 2*  EAG 05/08/2018 189     Cholesterol 05/08/2018 112     Triglycerides 05/08/2018 63     HDL, Direct 05/08/2018 57     LDL Calculated 05/08/2018 42     Non-HDL-Chol (CHOL-HDL) 05/08/2018 55     Sodium 05/08/2018 137     Potassium 05/08/2018 4 4     Chloride 05/08/2018 105     CO2 05/08/2018 26     Anion Gap 05/08/2018 6     BUN 05/08/2018 43*    Creatinine 05/08/2018 1 08     Glucose, Fasting 05/08/2018 164*    Calcium 05/08/2018 9 0     eGFR 05/08/2018 64    Transcribe Orders on 05/08/2018   Component Date Value    Creatinine, Ur 05/08/2018 15 1     Microalbum  ,U,Random 05/08/2018 292 0*    Microalb Creat Ratio 05/08/2018 1934*    Clarity, UA 05/14/2018 Cloudy     Color, UA 05/14/2018 Yellow     Specific Gravity, UA 05/14/2018 1 009     pH, UA 05/14/2018 6 5     Glucose, UA 05/14/2018 Negative     Ketones, UA 05/14/2018 Negative     Blood, UA 05/14/2018 Moderate*    Protein, UA 05/14/2018 30 (1+)*    Nitrite, UA 05/14/2018 Negative     Bilirubin, UA 05/14/2018 Negative     Urobilinogen, UA 05/14/2018 1 0     Leukocytes, UA 05/14/2018 Large*    WBC, UA 05/14/2018 Innumerable*    RBC, UA 05/14/2018 4-10*    Hyaline Casts, UA 05/14/2018 None Seen     Bacteria, UA 05/14/2018 Occasional     Epithelial Cells 05/14/2018 None Seen      Imaging: Us Scrotum And Testicles    Result Date: 5/23/2018  Narrative: SCROTAL ULTRASOUND INDICATION:    Right side of scrotum swollen, erythematous, firm anteriorly  COMPARISON: 3/2/2015 TECHNIQUE:   Ultrasound the scrotal contents was performed with a high frequency linear transducer utilizing volumetric sweep imaging as well as standard still image techniques  Imaging performed in longitudinal and transverse orientation  Color and spectral Doppler evaluation also performed bilaterally  FINDINGS: TESTES: Testes are symmetric and normal in size  RIGHT testis = 3 4 x 2 7 x 2 7 cm Normal contour with homogeneous smooth echotexture   No intratesticular mass lesion or calcifications  LEFT testis = 3 4 x 2 3 x 2 5 cm Normal contour with homogeneous smooth echotexture  No intratesticular mass lesion or calcifications  Doppler flow within both testes is present and symmetric  EPIDIDYMIDES: The right epididymis is markedly enlarged and heterogeneous measuring 3 4 x 2 1 x 1 7 cm  There is an organizing complex collection within the head of the epididymis measuring 1 1 x 1 2 x 1 2 cm  Color flow is present around the margins of the collection  The left epididymis is normal in size and echogenicity  HYDROCELE:  Small, mildly complex right hydrocele  VARICOCELE:  None present  SCROTUM: Marked inflammation of the right aspect of the scrotum  Impression:  1  Markedly enlarged and hyperemic right epididymis consistent with epididymitis  Complex collection in the head of the epididymis measuring 1 2 cm likely to represent an organizing abscess  No evidence of testicular torsion or orchitis  2   Small right hydrocele, likely reactive  3   Marked inflammation or edema in the right aspect of the scrotum  The study was marked in [facility] for immediate notification   Workstation performed: [id number]       Review of Systems:  Review of Systems   REVIEW OF SYSTEMS:  Constitutional:  Denies fever or chills   Eyes:  Denies change in visual acuity   HENT:  Denies nasal congestion or sore throat   Respiratory:   shortness of breath   Cardiovascular:  edema   GI:  Denies abdominal pain, nausea, vomiting, bloody stools or diarrhea   :  Denies dysuria, frequency, difficulty in micturition and nocturia  Musculoskeletal:  Denies back pain or joint pain   Neurologic:  Denies headache, focal weakness or sensory changes   Endocrine:  Denies polyuria or polydipsia   Lymphatic:  Denies swollen glands   Psychiatric:  Denies depression or anxiety     Physical Exam:    /70   Pulse 64   Ht 5' 10" (1 778 m)   Wt 110 kg (243 lb)   SpO2 96%   BMI 34 87 kg/m²     Physical Exam   PHYSICAL EXAM:  General:  Patient is not in acute distress   Head: Normocephalic, Atraumatic  HEENT:  Both pupils normal-size atraumatic, normocephalic, nonicteric  Neck:  JVP not raised  Trachea central  No carotid bruit  Respiratory:  Decreased breath sounds  Cardiovascular:  Irregularly irregular  3/6 systolic ejection murmur in the right sternal border  GI:  Abdomen soft nontender  No organomegaly  Lymphatic:  No cervical or inguinal lymphadenopathy  Neurologic:  Patient is awake alert, oriented   Grossly nonfocal  Extremities reveal 3+ edema    Discussion/Summary:   Patient with multiple medical problems who seems to be doing reasonably well from cardiac standpoint  Previous studies reviewed with patient  Medications reviewed and possible side effects discussed  concepts of cardiovascular disease , signs and symptoms of heart disease  Dietary and risk factor modification reinforced  All questions answered  Safety measures reviewed  Patient advised to report any problems prompting medical attention  Lengthy discussion with patient and family regarding risks and benefits of anticoagulation to prevent thromboembolic risk from atrial fibrillation  Patient will presently stay on the Eliquis 5 mg twice a day which is the recommended dose  At this time will discontinue aspirin because of excessive bruising  Patient to report any significant bleeding issues  Patient will continue all salt restriction  All his medications were reviewed  Some prescriptions were refilled  Follow-up in a few months or earlier as needed  Patient and family had a few questions which were answered  Patient also has history of aortic stenosis    Patient will have a follow-up echocardiogram to reassess aortic stenosis which has been moderate in the past

## 2018-06-17 DIAGNOSIS — E79.0 HYPERURICEMIA: ICD-10-CM

## 2018-06-18 RX ORDER — ALLOPURINOL 300 MG/1
TABLET ORAL
Qty: 90 TABLET | Refills: 0 | Status: SHIPPED | OUTPATIENT
Start: 2018-06-18 | End: 2018-06-25 | Stop reason: SDUPTHER

## 2018-06-25 ENCOUNTER — OFFICE VISIT (OUTPATIENT)
Dept: INTERNAL MEDICINE CLINIC | Facility: CLINIC | Age: 83
End: 2018-06-25
Payer: MEDICARE

## 2018-06-25 VITALS
HEIGHT: 70 IN | HEART RATE: 52 BPM | SYSTOLIC BLOOD PRESSURE: 140 MMHG | BODY MASS INDEX: 34.79 KG/M2 | OXYGEN SATURATION: 96 % | DIASTOLIC BLOOD PRESSURE: 68 MMHG | WEIGHT: 243 LBS

## 2018-06-25 DIAGNOSIS — N45.3 EPIDIDYMOORCHITIS: ICD-10-CM

## 2018-06-25 DIAGNOSIS — I10 ESSENTIAL HYPERTENSION: ICD-10-CM

## 2018-06-25 DIAGNOSIS — E79.0 HYPERURICEMIA: ICD-10-CM

## 2018-06-25 DIAGNOSIS — I48.20 CHRONIC A-FIB (HCC): ICD-10-CM

## 2018-06-25 DIAGNOSIS — I89.0 LYMPHEDEMA OF BOTH LOWER EXTREMITIES: ICD-10-CM

## 2018-06-25 DIAGNOSIS — E11.65 TYPE 2 DIABETES MELLITUS WITH HYPERGLYCEMIA, WITH LONG-TERM CURRENT USE OF INSULIN (HCC): ICD-10-CM

## 2018-06-25 DIAGNOSIS — Z95.1 HX OF CABG: Primary | ICD-10-CM

## 2018-06-25 DIAGNOSIS — Z79.01 CHRONIC ANTICOAGULATION: ICD-10-CM

## 2018-06-25 DIAGNOSIS — Z79.4 TYPE 2 DIABETES MELLITUS WITH HYPERGLYCEMIA, WITH LONG-TERM CURRENT USE OF INSULIN (HCC): ICD-10-CM

## 2018-06-25 PROCEDURE — 99214 OFFICE O/P EST MOD 30 MIN: CPT | Performed by: INTERNAL MEDICINE

## 2018-06-25 RX ORDER — ALLOPURINOL 300 MG/1
300 TABLET ORAL DAILY
Qty: 90 TABLET | Refills: 2 | Status: SHIPPED | OUTPATIENT
Start: 2018-06-25

## 2018-06-25 NOTE — PROGRESS NOTES
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 he is going to follow up with the urologist     Recently saw the cardiologist and was stable  Has diabetes which is followed by his endocrinologist     He is on Eliquis  No longer on aspirin  His edema is unchanged  Will see him back in 4 months unless he is in [place] at that time  If he is in [place] he will see his primary care doctor down there  He will follow up with Cardiology as well    Recent Results (from the past 1008 hour(s))   CBC and differential    Collection Time: 05/23/18  4:13 PM   Result Value Ref Range    WBC 13 16 (H) 4 31 - 10 16 Thousand/uL    RBC 4 39 3 88 - 5 62 Million/uL    Hemoglobin 12 9 12 0 - 17 0 g/dL    Hematocrit 39 7 36 5 - 49 3 %    MCV 90 82 - 98 fL    MCH 29 4 26 8 - 34 3 pg    MCHC 32 5 31 4 - 37 4 g/dL    RDW 16 8 (H) 11 6 - 15 1 %    MPV 12 7 8 9 - 12 7 fL    Platelets 539 381 - 665 Thousands/uL    nRBC 0 /100 WBCs    Neutrophils Relative 80 (H) 43 - 75 %    Immat GRANS % 1 0 - 2 %    Lymphocytes Relative 10 (L) 14 - 44 %    Monocytes Relative 8 4 - 12 %    Eosinophils Relative 1 0 - 6 %    Basophils Relative 0 0 - 1 %    Neutrophils Absolute 10 58 (H) 1 85 - 7 62 Thousands/µL    Immature Grans Absolute 0 12 0 00 - 0 20 Thousand/uL    Lymphocytes Absolute 1 29 0 60 - 4 47 Thousands/µL    Monocytes Absolute 1 06 0 17 - 1 22 Thousand/µL    Eosinophils Absolute 0 06 0 00 - 0 61 Thousand/µL    Basophils Absolute 0 05 0 00 - 0 10 Thousands/µL   Basic metabolic panel    Collection Time: 05/23/18  4:13 PM   Result Value Ref Range    Sodium 133 (L) 136 - 145 mmol/L    Potassium 4 3 3 5 - 5 3 mmol/L    Chloride 100 100 - 108 mmol/L    CO2 25 21 - 32 mmol/L    Anion Gap 8 4 - 13 mmol/L    BUN 55 (H) 5 - 25 mg/dL    Creatinine 1 26 0 60 - 1 30 mg/dL    Glucose 153 (H) 65 - 140 mg/dL    Calcium 8 7 8 3 - 10 1 mg/dL    eGFR 53 ml/min/1 73sq m   UA w Reflex to Microscopic w Reflex to Culture    Collection Time: 05/23/18  4:14 PM   Result Value Ref Range    Color, UA Yellow     Clarity, UA Clear     Specific Boyle, UA 1 010 1 003 - 1 030    pH, UA 5 5 4 5 - 8 0    Leukocytes, UA Small (A) Negative    Nitrite, UA Negative Negative    Protein, UA Negative Negative mg/dl    Glucose, UA Negative Negative mg/dl    Ketones, UA Negative Negative mg/dl    Urobilinogen, UA 0 2 0 2, 1 0 E U /dl E U /dl    Bilirubin, UA Negative Negative    Blood, UA Small (A) Negative   Urine Microscopic    Collection Time: 05/23/18  4:14 PM   Result Value Ref Range    RBC, UA 2-4 (A) None Seen, 0-5 /hpf    WBC, UA 4-10 (A) None Seen, 0-5, 5-55, 5-65 /hpf    Epithelial Cells Occasional None Seen, Occasional /hpf    Bacteria, UA None Seen None Seen, Occasional /hpf    Hyaline Casts, UA 0-1 (A) (none) /lpf   Urine culture    Collection Time: 05/23/18  4:14 PM   Result Value Ref Range    Urine Culture No Growth <1000 cfu/mL    Blood culture    Collection Time: 05/23/18  9:08 PM   Result Value Ref Range    Blood Culture No Growth After 5 Days  Blood culture    Collection Time: 05/23/18  9:08 PM   Result Value Ref Range    Blood Culture No Growth After 5 Days      Fingerstick Glucose (POCT)    Collection Time: 05/23/18  9:29 PM   Result Value Ref Range    POC Glucose 218 (H) 65 - 140 mg/dl   Basic metabolic panel    Collection Time: 05/24/18  4:48 AM   Result Value Ref Range    Sodium 135 (L) 136 - 145 mmol/L    Potassium 4 2 3 5 - 5 3 mmol/L    Chloride 101 100 - 108 mmol/L    CO2 25 21 - 32 mmol/L    Anion Gap 9 4 - 13 mmol/L    BUN 51 (H) 5 - 25 mg/dL    Creatinine 1 25 0 60 - 1 30 mg/dL    Glucose 139 65 - 140 mg/dL    Calcium 8 8 8 3 - 10 1 mg/dL    eGFR 53 ml/min/1 73sq m   CBC (With Platelets)    Collection Time: 05/24/18  4:48 AM   Result Value Ref Range    WBC 11 15 (H) 4 31 - 10 16 Thousand/uL    RBC 4 51 3 88 - 5 62 Million/uL    Hemoglobin 13 3 12 0 - 17 0 g/dL    Hematocrit 40 6 36 5 - 49 3 %    MCV 90 82 - 98 fL    MCH 29 5 26 8 - 34 3 pg    MCHC 32 8 31 4 - 37 4 g/dL    RDW 16 8 (H) 11 6 - 15 1 %    Platelets 732 147 - 007 Thousands/uL    MPV 13 4 (H) 8 9 - 12 7 fL   Fingerstick Glucose (POCT)    Collection Time: 05/24/18  6:09 AM   Result Value Ref Range    POC Glucose 133 65 - 140 mg/dl   Fingerstick Glucose (POCT)    Collection Time: 05/24/18 12:03 PM   Result Value Ref Range    POC Glucose 150 (H) 65 - 140 mg/dl   Fingerstick Glucose (POCT)    Collection Time: 05/24/18  4:41 PM   Result Value Ref Range    POC Glucose 215 (H) 65 - 140 mg/dl   Fingerstick Glucose (POCT)    Collection Time: 05/24/18  9:26 PM   Result Value Ref Range    POC Glucose 229 (H) 65 - 140 mg/dl   CBC    Collection Time: 05/25/18  4:50 AM   Result Value Ref Range    WBC 9 37 4 31 - 10 16 Thousand/uL    RBC 4 39 3 88 - 5 62 Million/uL    Hemoglobin 13 0 12 0 - 17 0 g/dL    Hematocrit 39 6 36 5 - 49 3 %    MCV 90 82 - 98 fL    MCH 29 6 26 8 - 34 3 pg    MCHC 32 8 31 4 - 37 4 g/dL    RDW 16 8 (H) 11 6 - 15 1 %    Platelets 451 247 - 965 Thousands/uL    MPV 12 9 (H) 8 9 - 12 7 fL   Comprehensive metabolic panel    Collection Time: 05/25/18  4:50 AM   Result Value Ref Range    Sodium 136 136 - 145 mmol/L    Potassium 3 9 3 5 - 5 3 mmol/L    Chloride 102 100 - 108 mmol/L    CO2 24 21 - 32 mmol/L    Anion Gap 10 4 - 13 mmol/L    BUN 46 (H) 5 - 25 mg/dL    Creatinine 1 06 0 60 - 1 30 mg/dL    Glucose 140 65 - 140 mg/dL    Calcium 8 8 8 3 - 10 1 mg/dL    AST 26 5 - 45 U/L    ALT 18 12 - 78 U/L    Alkaline Phosphatase 388 (H) 46 - 116 U/L    Total Protein 6 6 6 4 - 8 2 g/dL    Albumin 2 1 (L) 3 5 - 5 0 g/dL    Total Bilirubin 1 40 (H) 0 20 - 1 00 mg/dL    eGFR 65 ml/min/1 73sq m   Fingerstick Glucose (POCT)    Collection Time: 05/25/18  6:19 AM   Result Value Ref Range    POC Glucose 142 (H) 65 - 140 mg/dl   Fingerstick Glucose (POCT)    Collection Time: 05/25/18 11:25 AM   Result Value Ref Range    POC Glucose 139 65 - 140 mg/dl   Fingerstick Glucose (POCT)    Collection Time: 05/25/18  3:21 PM   Result Value Ref Range    POC Glucose 178 (H) 65 - 140 mg/dl   Fingerstick Glucose (POCT)    Collection Time: 05/25/18  4:26 PM   Result Value Ref Range    POC Glucose 172 (H) 65 - 140 mg/dl   Fingerstick Glucose (POCT)    Collection Time: 05/25/18  8:52 PM   Result Value Ref Range    POC Glucose 190 (H) 65 - 140 mg/dl   Comprehensive metabolic panel    Collection Time: 05/26/18  4:57 AM   Result Value Ref Range    Sodium 137 136 - 145 mmol/L    Potassium 3 7 3 5 - 5 3 mmol/L    Chloride 104 100 - 108 mmol/L    CO2 24 21 - 32 mmol/L    Anion Gap 9 4 - 13 mmol/L    BUN 40 (H) 5 - 25 mg/dL    Creatinine 1 03 0 60 - 1 30 mg/dL    Glucose 102 65 - 140 mg/dL    Calcium 8 5 8 3 - 10 1 mg/dL    AST 31 5 - 45 U/L    ALT 18 12 - 78 U/L    Alkaline Phosphatase 402 (H) 46 - 116 U/L    Total Protein 6 4 6 4 - 8 2 g/dL    Albumin 2 0 (L) 3 5 - 5 0 g/dL    Total Bilirubin 1 20 (H) 0 20 - 1 00 mg/dL    eGFR 67 ml/min/1 73sq m   CBC    Collection Time: 05/26/18  4:57 AM   Result Value Ref Range    WBC 6 61 4 31 - 10 16 Thousand/uL    RBC 4 40 3 88 - 5 62 Million/uL    Hemoglobin 13 0 12 0 - 17 0 g/dL    Hematocrit 39 9 36 5 - 49 3 %    MCV 91 82 - 98 fL    MCH 29 5 26 8 - 34 3 pg    MCHC 32 6 31 4 - 37 4 g/dL    RDW 16 8 (H) 11 6 - 15 1 %    Platelets 485 820 - 596 Thousands/uL    MPV 12 6 8 9 - 12 7 fL   Fingerstick Glucose (POCT)    Collection Time: 05/26/18  6:19 AM   Result Value Ref Range    POC Glucose 93 65 - 140 mg/dl       1  Hyperuricemia  allopurinol (ZYLOPRIM) 300 mg tablet       No orders of the defined types were placed in this encounter  Subjective:  Problems are 1  Coronary artery disease 2  Chronic atrial fibrillation 3  Chronic anticoagulation 4  Diabetes 5  Chronic edema 6  History of orchitis     Patient ID: Bj Sosa is a 81 y o  male  HPI he comes in for follow-up  He is actually feeling fairly well    He had been hospitalized a month or 2 ago with orchitis and an abscess  He is doing well at this point is going to see the urologist   Recently saw his cardiologist     The following portions of the patient's history were reviewed and updated as appropriate:   He has a past medical history of Aortic stenosis; Atrial fibrillation ([facility] [address] ); Cellulitis of hand, left; Cellulitis of hand, right; CHF (congestive heart failure) ([facility] [address] ); Coronary artery disease; Diabetes mellitus ([facility] [address] ); GI hemorrhage; Gout; Hyperlipidemia; Hypertension; Lung mass; Nonspecific abnormal findings on imaging of lung; Thrombocytopenia ([facility] [address] ); and Vitamin D deficiency  ,   does not have any pertinent problems on file  ,   has a past surgical history that includes Coronary artery bypass graft; Fracture surgery; Cardiac surgery; Joint replacement; Hip surgery (Left); and Knee surgery (Left)  ,  family history includes Cancer in his family; Diabetes in his family; Heart disease in his family; No Known Problems in his father  ,   reports that he has quit smoking  He has never used smokeless tobacco  He reports that he does not drink alcohol or use drugs  ,  is allergic to flovent [fluticasone]; medical tape; other; and statins       Current Outpatient Prescriptions:     allopurinol (ZYLOPRIM) 300 mg tablet, Take 1 tablet (300 mg total) by mouth daily, Disp: 90 tablet, Rfl: 2    apixaban (ELIQUIS) 5 mg, Take 1 tablet (5 mg total) by mouth 2 (two) times a day, Disp: 60 tablet, Rfl: 11    colchicine (COLCRYS) 0 6 mg tablet, Take 1 tablet (0 6 mg total) by mouth daily, Disp: 90 tablet, Rfl: 0    insulin aspart (NovoLOG) 100 units/mL injection, Inject 30 Units under the skin 3 (three) times a day before meals  , Disp: , Rfl:     insulin degludec (TRESIBA) injection pen 100 units/mL, Inject under the skin daily 16 UNIT DAILY AT BEDTIME, Disp: , Rfl:     Insulin Syringe-Needle U-100 (ULTICARE INSULIN SYRINGE) 31G X 5/16" 1 ML MISC, by Does not apply route, Disp: , Rfl:     isosorbide mononitrate (IMDUR) 30 mg 24 hr tablet, Take 1 tablet (30 mg total) by mouth daily, Disp: 90 tablet, Rfl: 0    KLOR-CON M20 20 MEQ tablet, TAKE 1 TABLET BY MOUTH EVERY 12 HOURS, Disp: 180 tablet, Rfl: 1    metolazone (ZAROXOLYN) 2 5 mg tablet, Take 1 tablet (2 5 mg total) by mouth daily, Disp: 30 tablet, Rfl: 8    metoprolol succinate (TOPROL-XL) 25 mg 24 hr tablet, Take 1 tablet (25 mg total) by mouth daily, Disp: 90 tablet, Rfl: 0    torsemide (DEMADEX) 20 mg tablet, Take 2 tablets (40 mg total) by mouth daily  , Disp: 60 tablet, Rfl: 0    docusate sodium (COLACE) 100 mg capsule, Take 1 capsule by mouth every 12 (twelve) hours for 30 days, Disp: 60 capsule, Rfl: 0    Review of Systems   Constitutional: Negative for activity change, appetite change, chills, diaphoresis, fatigue, fever and unexpected weight change  HENT: Negative for congestion, ear pain, hearing loss, mouth sores, nosebleeds, postnasal drip, sinus pain, sinus pressure, sore throat and trouble swallowing  Eyes: Negative for pain, discharge and visual disturbance  Respiratory: Positive for shortness of breath  Negative for apnea, cough, chest tightness and wheezing  Cardiovascular: Positive for leg swelling  Negative for chest pain and palpitations  Gastrointestinal: Negative for abdominal pain, anal bleeding, blood in stool, constipation, diarrhea, nausea and vomiting  Moderately overweight   Endocrine: Negative for polydipsia and polyphagia  Has type 2 diabetes  Followed by the endocrinologist    Genitourinary: Negative for decreased urine volume, dysuria, flank pain, frequency, hematuria and urgency  Musculoskeletal: Negative for arthralgias, back pain, gait problem, joint swelling and myalgias  Skin: Negative for rash and wound  Allergic/Immunologic: Negative for environmental allergies and food allergies  Neurological: Negative for dizziness, tremors, seizures, syncope, speech difficulty, light-headedness, numbness and headaches     Hematological: Negative for adenopathy  Does not bruise/bleed easily  Psychiatric/Behavioral: Negative for agitation, confusion, hallucinations, sleep disturbance and suicidal ideas  The patient is not nervous/anxious  Objective:  /68 (BP Location: Left arm)   Pulse (!) 52   Ht 5' 10" (1 778 m)   Wt 110 kg (243 lb)   SpO2 96%   BMI 34 87 kg/m²      Physical Exam   Constitutional: He appears well-developed  No distress  Moderately overweight  Blood pressure is 120/60  Rhythm is irregularly irregular  Rate in the 60s  HENT:   Head: Normocephalic  Right Ear: External ear normal    Left Ear: External ear normal    Nose: Nose normal    Mouth/Throat: Oropharynx is clear and moist  No oropharyngeal exudate  Eyes: Conjunctivae and EOM are normal  Pupils are equal, round, and reactive to light  Right eye exhibits no discharge  Left eye exhibits no discharge  Neck: Normal range of motion  Neck supple  No thyromegaly present  Cardiovascular: Normal rate and intact distal pulses  Exam reveals no gallop and no friction rub  No murmur heard  Rhythm is irregularly irregular  2/6 systolic murmur  Pulmonary/Chest: Effort normal and breath sounds normal  No respiratory distress  He has no wheezes  He has no rales  Diffusely diminished breath sounds   Abdominal: Soft  Bowel sounds are normal  He exhibits no distension and no mass  There is no tenderness  There is no rebound and no guarding  Abdomen is moderately overweight  Musculoskeletal: Normal range of motion  He exhibits edema  He exhibits no tenderness or deformity  2+ peripheral edema  Lymphadenopathy:     He has no cervical adenopathy  Neurological: He is alert  He has normal reflexes  No cranial nerve deficit  Coordination normal    Skin: Skin is warm and dry  No rash noted  No erythema  Psychiatric: He has a normal mood and affect  His behavior is normal  Judgment and thought content normal    Nursing note and vitals reviewed

## 2018-06-27 ENCOUNTER — PATIENT OUTREACH (OUTPATIENT)
Dept: FAMILY MEDICINE CLINIC | Facility: CLINIC | Age: 83
End: 2018-06-27

## 2018-07-02 ENCOUNTER — HOSPITAL ENCOUNTER (OUTPATIENT)
Dept: NON INVASIVE DIAGNOSTICS | Facility: CLINIC | Age: 83
Discharge: HOME | End: 2018-07-02
Payer: MEDICARE

## 2018-07-02 DIAGNOSIS — I35.0 NONRHEUMATIC AORTIC VALVE STENOSIS: ICD-10-CM

## 2018-07-02 PROCEDURE — 93306 TTE W/DOPPLER COMPLETE: CPT

## 2018-07-03 ENCOUNTER — PATIENT OUTREACH (OUTPATIENT)
Dept: FAMILY MEDICINE CLINIC | Facility: CLINIC | Age: 83
End: 2018-07-03

## 2018-07-03 PROCEDURE — 93306 TTE W/DOPPLER COMPLETE: CPT | Performed by: INTERNAL MEDICINE

## 2018-07-10 ENCOUNTER — PATIENT OUTREACH (OUTPATIENT)
Dept: FAMILY MEDICINE CLINIC | Facility: CLINIC | Age: 83
End: 2018-07-10

## 2018-07-10 NOTE — PROGRESS NOTES
Multiple attempts made to contact patient to follow up on health and outreach on any possible services  Voicemail left x3 with no return calls  Care coordination letter sent to patient  Will close from care coordination at this time

## 2018-08-01 DIAGNOSIS — M10.9 ACUTE GOUTY ARTHROPATHY: ICD-10-CM

## 2018-08-01 RX ORDER — COLCHICINE 0.6 MG/1
0.6 TABLET ORAL DAILY
Qty: 90 TABLET | Refills: 0 | OUTPATIENT
Start: 2018-08-01

## 2018-09-04 DIAGNOSIS — I10 ESSENTIAL HYPERTENSION: ICD-10-CM

## 2018-09-04 DIAGNOSIS — I25.10 ATHEROSCLEROTIC CARDIOVASCULAR DISEASE: ICD-10-CM

## 2018-09-04 DIAGNOSIS — M10.9 ACUTE GOUTY ARTHROPATHY: ICD-10-CM

## 2018-09-04 NOTE — TELEPHONE ENCOUNTER
NEEDS  METOPROLOL SUCCINATE 25MG  QD #90  COLCHICINE 0 6MG QD #30  ISOSORBIDE MONONITRATE 30MG QD #90  [place]   [phone]

## 2018-09-05 RX ORDER — COLCHICINE 0.6 MG/1
0.6 TABLET ORAL DAILY
Qty: 90 TABLET | Refills: 0 | Status: SHIPPED | OUTPATIENT
Start: 2018-09-05

## 2018-09-05 RX ORDER — METOPROLOL SUCCINATE 25 MG/1
25 TABLET, EXTENDED RELEASE ORAL DAILY
Qty: 90 TABLET | Refills: 0 | Status: SHIPPED | OUTPATIENT
Start: 2018-09-05

## 2018-09-05 RX ORDER — ISOSORBIDE MONONITRATE 30 MG/1
30 TABLET, EXTENDED RELEASE ORAL DAILY
Qty: 90 TABLET | Refills: 0 | Status: SHIPPED | OUTPATIENT
Start: 2018-09-05

## 2018-09-24 ENCOUNTER — CONSULT (OUTPATIENT)
Dept: INTERNAL MEDICINE CLINIC | Facility: CLINIC | Age: 83
End: 2018-09-24
Payer: MEDICARE

## 2018-09-24 VITALS
DIASTOLIC BLOOD PRESSURE: 82 MMHG | BODY MASS INDEX: 31.71 KG/M2 | TEMPERATURE: 97.4 F | SYSTOLIC BLOOD PRESSURE: 130 MMHG | HEART RATE: 61 BPM | OXYGEN SATURATION: 94 % | WEIGHT: 221 LBS

## 2018-09-24 DIAGNOSIS — H33.22 LEFT RETINAL DETACHMENT: ICD-10-CM

## 2018-09-24 DIAGNOSIS — Z01.818 PREOPERATIVE CLEARANCE: Primary | ICD-10-CM

## 2018-09-24 LAB
LEFT EYE DIABETIC RETINOPATHY: NORMAL
RIGHT EYE DIABETIC RETINOPATHY: NORMAL

## 2018-09-24 PROCEDURE — 93000 ELECTROCARDIOGRAM COMPLETE: CPT | Performed by: PHYSICIAN ASSISTANT

## 2018-09-24 PROCEDURE — 99214 OFFICE O/P EST MOD 30 MIN: CPT | Performed by: PHYSICIAN ASSISTANT

## 2018-09-24 RX ORDER — INSULIN ASPART 100 [IU]/ML
INJECTION, SOLUTION INTRAVENOUS; SUBCUTANEOUS
COMMUNITY
Start: 2018-09-09

## 2018-09-24 NOTE — PROGRESS NOTES
Assessment/Plan:    Preoperative clearance for retinal surgery-left eye, date of surgery September 25th, Dr Diann Casiano  EKG performed shows atrial fibrillation, right bundle branch block, bradycardia, no change from prior EKG in May of 2017  Due to the urgent nature of the patient's condition and need for surgery tomorrow morning, further preoperative testing the as unable to be performed  The patient's surgeon has instructed him to hold his Eliquis tonight and tomorrow morning  The patient is advised to follow these instructions as ordered by the surgeon  There are no contraindications for the patient to proceed with the planned surgery  The case is discussed with Dr Truman Paget who saw the patient and agrees with the assessment and plan    No problem-specific Assessment & Plan notes found for this encounter  Diagnoses and all orders for this visit:    Preoperative clearance  -     POCT ECG    Other orders  -     NOVOLOG FLEXPEN 100 units/mL injection pen;           Subjective:      Patient ID: Estephania Ho is a 81 y o  male  Preoperative clearance, retina surgery on the left eye  The patient has a history of a retinal detachment which was repaired  He says it is detach Ng again  He saw Dr Tyron Espino today who sent him urgently to a retinal specialist, Dr Diann Casiano  He is having surgery tomorrow morning at 9:00 a m     Because of the urgent and emergent nature, preoperative testing is limited  The patient feels well today  He denies any chest pain or shortness of breath  Patient and his daughter report that they were told by the surgeon to hold his Eliquis tonight and tomorrow morning  The following portions of the patient's history were reviewed and updated as appropriate: allergies, current medications, past family history, past medical history, past social history, past surgical history and problem list     Review of Systems   Constitutional: Negative for chills, fatigue and fever  Eyes: Positive for visual disturbance  Respiratory: Negative for cough and shortness of breath  Cardiovascular: Negative for chest pain and palpitations  Gastrointestinal: Negative for abdominal pain, diarrhea and nausea  Objective:      /82 (BP Location: Left arm, Patient Position: Sitting, Cuff Size: Standard)   Pulse 61   Temp (!) 97 4 °F (36 3 °C)   Wt 100 kg (221 lb)   SpO2 94%   BMI 31 71 kg/m²          Physical Exam   Constitutional: He appears well-developed and well-nourished  HENT:   Head: Normocephalic and atraumatic  Right Ear: External ear normal    Left Ear: External ear normal    Mouth/Throat: Oropharynx is clear and moist  No oropharyngeal exudate  Cardiovascular: Normal rate  An irregularly irregular rhythm present  Pulmonary/Chest: Effort normal and breath sounds normal  No respiratory distress  Abdominal: Soft  Bowel sounds are normal  There is no tenderness

## 2018-09-27 ENCOUNTER — TELEPHONE (OUTPATIENT)
Dept: DERMATOLOGY | Facility: CLINIC | Age: 83
End: 2018-09-27

## 2018-10-01 LAB
LEFT EYE DIABETIC RETINOPATHY: NORMAL
RIGHT EYE DIABETIC RETINOPATHY: NORMAL

## 2018-10-13 DIAGNOSIS — I10 ESSENTIAL HYPERTENSION: ICD-10-CM

## 2018-10-15 RX ORDER — POTASSIUM CHLORIDE 1500 MG/1
TABLET, EXTENDED RELEASE ORAL
Qty: 180 TABLET | Refills: 1 | Status: SHIPPED | OUTPATIENT
Start: 2018-10-15

## 2018-10-15 RX ORDER — TORSEMIDE 20 MG/1
TABLET ORAL
Qty: 270 TABLET | Refills: 2 | Status: SHIPPED | OUTPATIENT
Start: 2018-10-15

## 2018-11-06 ENCOUNTER — TELEPHONE (OUTPATIENT)
Dept: INTERNAL MEDICINE CLINIC | Facility: CLINIC | Age: 83
End: 2018-11-06

## 2018-11-06 NOTE — TELEPHONE ENCOUNTER
Called patient: To obtain new mailing address (No show letter returned to us) and to reschedule 4 month fup with Dr Martell Boles

## 2019-07-02 ENCOUNTER — TELEPHONE (OUTPATIENT)
Dept: INTERNAL MEDICINE CLINIC | Facility: CLINIC | Age: 84
End: 2019-07-02